# Patient Record
Sex: MALE | Race: BLACK OR AFRICAN AMERICAN | URBAN - METROPOLITAN AREA
[De-identification: names, ages, dates, MRNs, and addresses within clinical notes are randomized per-mention and may not be internally consistent; named-entity substitution may affect disease eponyms.]

---

## 2018-08-17 ENCOUNTER — HISTORICAL (OUTPATIENT)
Dept: ADMINISTRATIVE | Facility: HOSPITAL | Age: 43
End: 2018-08-17

## 2018-08-17 LAB
ABS NEUT (OLG): 1.4 X10(3)/MCL
ALBUMIN SERPL-MCNC: 3.6 GM/DL (ref 3.4–5)
ALBUMIN/GLOB SERPL: 1 RATIO (ref 1–2)
ALP SERPL-CCNC: 61 UNIT/L (ref 45–117)
ALT SERPL-CCNC: 26 UNIT/L (ref 12–78)
ANISOCYTOSIS BLD QL SMEAR: ABNORMAL
APPEARANCE, UA: CLEAR
AST SERPL-CCNC: 25 UNIT/L (ref 15–37)
BACTERIA #/AREA URNS AUTO: ABNORMAL /[HPF]
BASOPHILS NFR BLD MANUAL: 0 %
BILIRUB SERPL-MCNC: 0.3 MG/DL (ref 0.2–1)
BILIRUB UR QL STRIP: NEGATIVE
BILIRUBIN DIRECT+TOT PNL SERPL-MCNC: 0.1 MG/DL
BILIRUBIN DIRECT+TOT PNL SERPL-MCNC: 0.2 MG/DL
BUN SERPL-MCNC: 10 MG/DL (ref 7–18)
CALCIUM SERPL-MCNC: 8.7 MG/DL (ref 8.5–10.1)
CD3+CD4+ CELLS # SPEC: 137 UNIT/L (ref 589–1505)
CD3+CD4+ CELLS NFR BLD: 6.8 % (ref 31–59)
CHLORIDE SERPL-SCNC: 109 MMOL/L (ref 98–107)
CHOLEST SERPL-MCNC: 97 MG/DL
CHOLEST/HDLC SERPL: 3.6 {RATIO} (ref 0–5)
CO2 SERPL-SCNC: 25 MMOL/L (ref 21–32)
COLOR UR: YELLOW
CREAT SERPL-MCNC: 0.8 MG/DL (ref 0.6–1.3)
DEPRECATED CALCIDIOL+CALCIFEROL SERPL-MC: 18.45 NG/ML (ref 30–80)
EOSINOPHIL NFR BLD MANUAL: 8 %
ERYTHROCYTE [DISTWIDTH] IN BLOOD BY AUTOMATED COUNT: 11.9 % (ref 11.5–14.5)
GLOBULIN SER-MCNC: 4.9 GM/ML (ref 2.3–3.5)
GLUCOSE (UA): NORMAL
GLUCOSE SERPL-MCNC: 83 MG/DL (ref 74–106)
GRANULOCYTES NFR BLD MANUAL: 33 % (ref 43–75)
HAV AB SER QL IA: REACTIVE
HBV SURFACE AB SER-ACNC: <3.1 MIU/ML
HBV SURFACE AB SERPL IA-ACNC: NONREACTIVE M[IU]/ML
HBV SURFACE AG SERPL QL IA: NEGATIVE
HCT VFR BLD AUTO: 40.9 % (ref 40–51)
HCV AB SERPL QL IA: NONREACTIVE
HDLC SERPL-MCNC: 27 MG/DL
HGB BLD-MCNC: 13.9 GM/DL (ref 13.5–17.5)
HGB UR QL STRIP: 0.2 MG/DL
HYALINE CASTS #/AREA URNS LPF: ABNORMAL /[LPF]
KETONES UR QL STRIP: NEGATIVE
LDLC SERPL CALC-MCNC: 34 MG/DL (ref 0–130)
LEUKOCYTE ESTERASE UR QL STRIP: NEGATIVE
LYMPHOCYTES # BLD AUTO: 2021 UNIT/L (ref 1260–5520)
LYMPHOCYTES NFR BLD MANUAL: 47 % (ref 20.5–51.1)
LYMPHOCYTES NFR LN MANUAL: 47 % (ref 28–48)
LYMPHOMA - T-CELL MARKERS SPEC-IMP: ABNORMAL
MCH RBC QN AUTO: 31.2 PG (ref 26–34)
MCHC RBC AUTO-ENTMCNC: 34 GM/DL (ref 31–37)
MCV RBC AUTO: 91.7 FL (ref 80–100)
MONOCYTES NFR BLD MANUAL: 12 % (ref 2–9)
NEG CONT SPOT COUNT: NORMAL
NITRITE UR QL STRIP: NEGATIVE
PANEL A SPOT COUNT: 0
PANEL B SPOT COUNT: 0
PH UR STRIP: 6.5 [PH] (ref 4.5–8)
PLATELET # BLD AUTO: 99 X10(3)/MCL (ref 130–400)
PLATELET # BLD EST: ABNORMAL 10*3/UL
PMV BLD AUTO: 13.7 FL (ref 7.4–10.4)
POS CONT SPOT COUNT: NORMAL
POTASSIUM SERPL-SCNC: 3.6 MMOL/L (ref 3.5–5.1)
PROT SERPL-MCNC: 8.5 GM/DL (ref 6.4–8.2)
PROT UR QL STRIP: 70 MG/DL
RBC # BLD AUTO: 4.46 X10(6)/MCL (ref 4.5–5.9)
RBC #/AREA URNS AUTO: ABNORMAL /[HPF]
RBC MORPH BLD: ABNORMAL
SODIUM SERPL-SCNC: 141 MMOL/L (ref 136–145)
SP GR UR STRIP: 1.03 (ref 1–1.03)
SQUAMOUS #/AREA URNS LPF: ABNORMAL /[LPF]
T PALLIDUM AB SER QL: NONREACTIVE
T-SPOT.TB: NEGATIVE
TRIGL SERPL-MCNC: 179 MG/DL
TSH SERPL-ACNC: 0.65 MIU/L (ref 0.36–3.74)
UROBILINOGEN UR STRIP-ACNC: 2 MG/DL
VLDLC SERPL CALC-MCNC: 36 MG/DL
WBC # BLD AUTO: 4300 /MM3 (ref 4500–11500)
WBC # SPEC AUTO: 4.3 X10(3)/MCL (ref 4.5–11)
WBC #/AREA URNS AUTO: ABNORMAL /HPF

## 2020-01-02 ENCOUNTER — HISTORICAL (OUTPATIENT)
Dept: ADMINISTRATIVE | Facility: HOSPITAL | Age: 45
End: 2020-01-02

## 2020-01-02 LAB
ABS NEUT (OLG): 9.36 X10(3)/MCL (ref 2.1–9.2)
ALBUMIN SERPL-MCNC: 3.3 GM/DL (ref 3.4–5)
ALBUMIN/GLOB SERPL: 0.5 RATIO (ref 1.1–2)
ALP SERPL-CCNC: 60 UNIT/L (ref 45–117)
ALT SERPL-CCNC: 15 UNIT/L (ref 12–78)
APPEARANCE, UA: CLEAR
APTT PPP: 32.9 SECOND(S) (ref 23.3–37)
AST SERPL-CCNC: 12 UNIT/L (ref 15–37)
BACTERIA #/AREA URNS AUTO: ABNORMAL /HPF
BASOPHILS # BLD AUTO: 0 X10(3)/MCL (ref 0–0.2)
BASOPHILS NFR BLD AUTO: 0 %
BILIRUB SERPL-MCNC: 0.5 MG/DL (ref 0.2–1)
BILIRUB UR QL STRIP: NEGATIVE
BILIRUBIN DIRECT+TOT PNL SERPL-MCNC: 0.2 MG/DL (ref 0–0.2)
BILIRUBIN DIRECT+TOT PNL SERPL-MCNC: 0.3 MG/DL
BUN SERPL-MCNC: 13 MG/DL (ref 7–18)
CALCIUM SERPL-MCNC: 9.6 MG/DL (ref 8.5–10.1)
CD3+CD4+ CELLS # SPEC: 115 UNIT/L (ref 589–1505)
CD3+CD4+ CELLS NFR BLD: 7.3 % (ref 31–59)
CHLORIDE SERPL-SCNC: 100 MMOL/L (ref 98–107)
CO2 SERPL-SCNC: 25 MMOL/L (ref 21–32)
COLOR UR: YELLOW
CREAT SERPL-MCNC: 1.1 MG/DL (ref 0.6–1.3)
EOSINOPHIL # BLD AUTO: 0 X10(3)/MCL (ref 0–0.9)
EOSINOPHIL NFR BLD AUTO: 0 %
ERYTHROCYTE [DISTWIDTH] IN BLOOD BY AUTOMATED COUNT: 12 % (ref 11.5–14.5)
GLOBULIN SER-MCNC: 6.4 GM/ML (ref 2.3–3.5)
GLUCOSE (UA): NEGATIVE
GLUCOSE SERPL-MCNC: 95 MG/DL (ref 74–106)
HAV IGM SERPL QL IA: NONREACTIVE
HBV CORE IGM SERPL QL IA: NONREACTIVE
HBV SURFACE AG SERPL QL IA: NEGATIVE
HCT VFR BLD AUTO: 43.5 % (ref 40–51)
HCV AB SERPL QL IA: NONREACTIVE
HGB BLD-MCNC: 14.4 GM/DL (ref 13.5–17.5)
HGB UR QL STRIP: 1 MG/DL
HYALINE CASTS #/AREA URNS LPF: ABNORMAL /LPF
IMM GRANULOCYTES # BLD AUTO: 0.12 10*3/UL
IMM GRANULOCYTES NFR BLD AUTO: 1 %
INR PPP: 1.06 (ref 0.9–1.2)
KETONES UR QL STRIP: ABNORMAL
LACTATE SERPL-SCNC: 1 MMOL/L (ref 0.4–2)
LEUKOCYTE ESTERASE UR QL STRIP: NEGATIVE
LIPASE SERPL-CCNC: 75 UNIT/L (ref 73–393)
LYMPHOCYTES # BLD AUTO: 1573 UNIT/L (ref 1260–5520)
LYMPHOCYTES # BLD AUTO: 2.5 X10(3)/MCL (ref 0.6–4.6)
LYMPHOCYTES NFR BLD AUTO: 19 %
LYMPHOCYTES NFR LN MANUAL: 13 % (ref 28–48)
LYMPHOMA - T-CELL MARKERS SPEC-IMP: ABNORMAL
MAGNESIUM SERPL-MCNC: 2.4 MG/DL (ref 1.6–2.6)
MCH RBC QN AUTO: 30.3 PG (ref 26–34)
MCHC RBC AUTO-ENTMCNC: 33.1 GM/DL (ref 31–37)
MCV RBC AUTO: 91.4 FL (ref 80–100)
MONOCYTES # BLD AUTO: 1.3 X10(3)/MCL (ref 0.1–1.3)
MONOCYTES NFR BLD AUTO: 10 %
NEUTROPHILS # BLD AUTO: 9.36 X10(3)/MCL (ref 2.1–9.2)
NEUTROPHILS NFR BLD AUTO: 70 %
NITRITE UR QL STRIP: NEGATIVE
PH UR STRIP: 6 [PH] (ref 4.5–8)
PLATELET # BLD AUTO: 141 X10(3)/MCL (ref 130–400)
PMV BLD AUTO: 13.4 FL (ref 7.4–10.4)
POTASSIUM SERPL-SCNC: 3.6 MMOL/L (ref 3.5–5.1)
PROT SERPL-MCNC: 9.7 GM/DL (ref 6.4–8.2)
PROT UR QL STRIP: 300 MG/DL
PROTHROMBIN TIME: 13.7 SECOND(S) (ref 11.9–14.4)
RBC # BLD AUTO: 4.76 X10(6)/MCL (ref 4.5–5.9)
RBC #/AREA URNS AUTO: ABNORMAL /HPF
SODIUM SERPL-SCNC: 131 MMOL/L (ref 136–145)
SP GR UR STRIP: 1.03 (ref 1–1.03)
SQUAMOUS #/AREA URNS LPF: ABNORMAL /LPF
T PALLIDUM AB SER QL: NONREACTIVE
UROBILINOGEN UR STRIP-ACNC: 4 MG/DL
WBC # BLD AUTO: ABNORMAL /MM3 (ref 4500–11500)
WBC # SPEC AUTO: 13.4 X10(3)/MCL (ref 4.5–11)
WBC #/AREA URNS AUTO: ABNORMAL /HPF

## 2020-01-03 LAB
ABS NEUT (OLG): 9.1 X10(3)/MCL (ref 2.1–9.2)
BASOPHILS # BLD AUTO: 0 X10(3)/MCL (ref 0–0.2)
BASOPHILS NFR BLD AUTO: 0 %
BUN SERPL-MCNC: 13 MG/DL (ref 7–18)
CALCIUM SERPL-MCNC: 8.2 MG/DL (ref 8.5–10.1)
CHLORIDE SERPL-SCNC: 106 MMOL/L (ref 98–107)
CO2 SERPL-SCNC: 23 MMOL/L (ref 21–32)
CREAT SERPL-MCNC: 1 MG/DL (ref 0.6–1.3)
CREAT/UREA NIT SERPL: 13
EOSINOPHIL # BLD AUTO: 0 X10(3)/MCL (ref 0–0.9)
EOSINOPHIL NFR BLD AUTO: 0 %
ERYTHROCYTE [DISTWIDTH] IN BLOOD BY AUTOMATED COUNT: 11.9 % (ref 11.5–14.5)
GLUCOSE SERPL-MCNC: 98 MG/DL (ref 74–106)
HCT VFR BLD AUTO: 36.3 % (ref 40–51)
HGB BLD-MCNC: 12 GM/DL (ref 13.5–17.5)
IMM GRANULOCYTES # BLD AUTO: 0.09 10*3/UL
IMM GRANULOCYTES NFR BLD AUTO: 1 %
LYMPHOCYTES # BLD AUTO: 1.6 X10(3)/MCL (ref 0.6–4.6)
LYMPHOCYTES NFR BLD AUTO: 13 %
MCH RBC QN AUTO: 30.2 PG (ref 26–34)
MCHC RBC AUTO-ENTMCNC: 33.1 GM/DL (ref 31–37)
MCV RBC AUTO: 91.4 FL (ref 80–100)
MONOCYTES # BLD AUTO: 1.3 X10(3)/MCL (ref 0.1–1.3)
MONOCYTES NFR BLD AUTO: 11 %
NEUTROPHILS # BLD AUTO: 9.1 X10(3)/MCL (ref 2.1–9.2)
NEUTROPHILS NFR BLD AUTO: 75 %
PLATELET # BLD AUTO: 113 X10(3)/MCL (ref 130–400)
PMV BLD AUTO: 12.8 FL (ref 7.4–10.4)
POTASSIUM SERPL-SCNC: 3.5 MMOL/L (ref 3.5–5.1)
RBC # BLD AUTO: 3.97 X10(6)/MCL (ref 4.5–5.9)
SODIUM SERPL-SCNC: 136 MMOL/L (ref 136–145)
WBC # SPEC AUTO: 12.1 X10(3)/MCL (ref 4.5–11)

## 2020-01-04 LAB — GRAM STN SPEC: NORMAL

## 2020-01-07 LAB — FINAL CULTURE: NORMAL

## 2020-02-07 LAB — FINAL CULTURE: NORMAL

## 2020-05-04 LAB
ABS NEUT (OLG): 6.9 X10(3)/MCL (ref 2.1–9.2)
ALBUMIN SERPL-MCNC: 3.1 GM/DL (ref 3.4–5)
ALBUMIN/GLOB SERPL: 0.5 RATIO (ref 1.1–2)
ALP SERPL-CCNC: 48 UNIT/L (ref 45–117)
ALT SERPL-CCNC: 12 UNIT/L (ref 12–78)
APTT PPP: 32.8 SECOND(S) (ref 23.3–37)
AST SERPL-CCNC: 26 UNIT/L (ref 15–37)
BASOPHILS # BLD AUTO: 0 X10(3)/MCL (ref 0–0.2)
BASOPHILS NFR BLD AUTO: 0 %
BILIRUB SERPL-MCNC: 0.3 MG/DL (ref 0.2–1)
BILIRUBIN DIRECT+TOT PNL SERPL-MCNC: 0.1 MG/DL
BILIRUBIN DIRECT+TOT PNL SERPL-MCNC: 0.2 MG/DL (ref 0–0.2)
BUN SERPL-MCNC: 43 MG/DL (ref 7–18)
CALCIUM SERPL-MCNC: 10.3 MG/DL (ref 8.5–10.1)
CHLORIDE SERPL-SCNC: 103 MMOL/L (ref 98–107)
CO2 SERPL-SCNC: 18 MMOL/L (ref 21–32)
CREAT SERPL-MCNC: 2.3 MG/DL (ref 0.6–1.3)
EOSINOPHIL # BLD AUTO: 0.1 X10(3)/MCL (ref 0–0.9)
EOSINOPHIL NFR BLD AUTO: 1 %
ERYTHROCYTE [DISTWIDTH] IN BLOOD BY AUTOMATED COUNT: 12.4 % (ref 11.5–14.5)
GLOBULIN SER-MCNC: 6.5 GM/ML (ref 2.3–3.5)
GLUCOSE SERPL-MCNC: 90 MG/DL (ref 74–106)
HCT VFR BLD AUTO: 31.1 % (ref 40–51)
HGB BLD-MCNC: 10.1 GM/DL (ref 13.5–17.5)
IMM GRANULOCYTES # BLD AUTO: 0.1 10*3/UL
IMM GRANULOCYTES NFR BLD AUTO: 1 %
INR PPP: 1.11 (ref 0.9–1.2)
LACTATE SERPL-SCNC: 1.4 MMOL/L (ref 0.4–2)
LYMPHOCYTES # BLD AUTO: 1.8 X10(3)/MCL (ref 0.6–4.6)
LYMPHOCYTES NFR BLD AUTO: 18 %
MCH RBC QN AUTO: 30.1 PG (ref 26–34)
MCHC RBC AUTO-ENTMCNC: 32.5 GM/DL (ref 31–37)
MCV RBC AUTO: 92.6 FL (ref 80–100)
MONOCYTES # BLD AUTO: 1 X10(3)/MCL (ref 0.1–1.3)
MONOCYTES NFR BLD AUTO: 10 %
NEUTROPHILS # BLD AUTO: 6.9 X10(3)/MCL (ref 2.1–9.2)
NEUTROPHILS NFR BLD AUTO: 70 %
PLATELET # BLD AUTO: 310 X10(3)/MCL (ref 130–400)
PMV BLD AUTO: 11.1 FL (ref 7.4–10.4)
POTASSIUM SERPL-SCNC: 4.6 MMOL/L (ref 3.5–5.1)
PROT SERPL-MCNC: 9.6 GM/DL (ref 6.4–8.2)
PROTHROMBIN TIME: 14.2 SECOND(S) (ref 11.9–14.4)
RBC # BLD AUTO: 3.36 X10(6)/MCL (ref 4.5–5.9)
SODIUM SERPL-SCNC: 132 MMOL/L (ref 136–145)
WBC # SPEC AUTO: 9.9 X10(3)/MCL (ref 4.5–11)

## 2020-05-05 LAB
ABS NEUT (OLG): 6.44 X10(3)/MCL (ref 2.1–9.2)
ALBUMIN SERPL-MCNC: 2.4 GM/DL (ref 3.4–5)
ALBUMIN/GLOB SERPL: 0.4 RATIO (ref 1.1–2)
ALP SERPL-CCNC: 40 UNIT/L (ref 45–117)
ALT SERPL-CCNC: 9 UNIT/L (ref 12–78)
APPEARANCE, UA: CLEAR
AST SERPL-CCNC: 21 UNIT/L (ref 15–37)
BACTERIA #/AREA URNS AUTO: ABNORMAL /HPF
BASOPHILS # BLD AUTO: 0 X10(3)/MCL (ref 0–0.2)
BASOPHILS NFR BLD AUTO: 0 %
BILIRUB SERPL-MCNC: 0.3 MG/DL (ref 0.2–1)
BILIRUB UR QL STRIP: NEGATIVE
BILIRUBIN DIRECT+TOT PNL SERPL-MCNC: 0.1 MG/DL
BILIRUBIN DIRECT+TOT PNL SERPL-MCNC: 0.2 MG/DL (ref 0–0.2)
BUN SERPL-MCNC: 24 MG/DL (ref 7–18)
BUN SERPL-MCNC: 31 MG/DL (ref 7–18)
CALCIUM SERPL-MCNC: 9 MG/DL (ref 8.5–10.1)
CALCIUM SERPL-MCNC: 9.2 MG/DL (ref 8.5–10.1)
CD3+CD4+ CELLS # SPEC: 129 UNIT/L (ref 589–1505)
CD3+CD4+ CELLS NFR BLD: 7.2 % (ref 31–59)
CHLORIDE SERPL-SCNC: 109 MMOL/L (ref 98–107)
CHLORIDE SERPL-SCNC: 110 MMOL/L (ref 98–107)
CHLORIDE UR-SCNC: 83 MMOL/L
CO2 SERPL-SCNC: 14 MMOL/L (ref 21–32)
CO2 SERPL-SCNC: 19 MMOL/L (ref 21–32)
COLOR UR: ABNORMAL
CREAT SERPL-MCNC: 1.5 MG/DL (ref 0.6–1.3)
CREAT SERPL-MCNC: 1.7 MG/DL (ref 0.6–1.3)
CREAT UR-MCNC: 68 MG/DL
CREAT/UREA NIT SERPL: 16
EOSINOPHIL # BLD AUTO: 0.1 X10(3)/MCL (ref 0–0.9)
EOSINOPHIL NFR BLD AUTO: 1 %
ERYTHROCYTE [DISTWIDTH] IN BLOOD BY AUTOMATED COUNT: 12.2 % (ref 11.5–14.5)
GLOBULIN SER-MCNC: 5.5 GM/ML (ref 2.3–3.5)
GLUCOSE (UA): NEGATIVE
GLUCOSE SERPL-MCNC: 116 MG/DL (ref 74–106)
GLUCOSE SERPL-MCNC: 99 MG/DL (ref 74–106)
HCT VFR BLD AUTO: 26.2 % (ref 40–51)
HGB BLD-MCNC: 8.5 GM/DL (ref 13.5–17.5)
HGB UR QL STRIP: NEGATIVE
HYALINE CASTS #/AREA URNS LPF: ABNORMAL /LPF
IMM GRANULOCYTES # BLD AUTO: 0.1 10*3/UL
IMM GRANULOCYTES NFR BLD AUTO: 1 %
KETONES UR QL STRIP: NEGATIVE
LEUKOCYTE ESTERASE UR QL STRIP: NEGATIVE
LYMPHOCYTES # BLD AUTO: 1785 UNIT/L (ref 1260–5520)
LYMPHOCYTES # BLD AUTO: 2 X10(3)/MCL (ref 0.6–4.6)
LYMPHOCYTES NFR BLD AUTO: 21 %
LYMPHOCYTES NFR LN MANUAL: 21 % (ref 28–48)
LYMPHOMA - T-CELL MARKERS SPEC-IMP: ABNORMAL
MCH RBC QN AUTO: 29.7 PG (ref 26–34)
MCHC RBC AUTO-ENTMCNC: 32.4 GM/DL (ref 31–37)
MCV RBC AUTO: 91.6 FL (ref 80–100)
MONOCYTES # BLD AUTO: 0.9 X10(3)/MCL (ref 0.1–1.3)
MONOCYTES NFR BLD AUTO: 9 %
NEUTROPHILS # BLD AUTO: 6.44 X10(3)/MCL (ref 2.1–9.2)
NEUTROPHILS NFR BLD AUTO: 67 %
NITRITE UR QL STRIP: NEGATIVE
PH UR STRIP: 5.5 [PH] (ref 4.5–8)
PLATELET # BLD AUTO: 273 X10(3)/MCL (ref 130–400)
PMV BLD AUTO: 11.6 FL (ref 7.4–10.4)
POTASSIUM SERPL-SCNC: 4.2 MMOL/L (ref 3.5–5.1)
POTASSIUM SERPL-SCNC: 4.3 MMOL/L (ref 3.5–5.1)
POTASSIUM UR-SCNC: 19 MMOL/L
PROT SERPL-MCNC: 7.9 GM/DL (ref 6.4–8.2)
PROT UR QL STRIP: 30 MG/DL
PROT UR STRIP-MCNC: 75 MG/DL
RBC # BLD AUTO: 2.86 X10(6)/MCL (ref 4.5–5.9)
RBC #/AREA URNS AUTO: ABNORMAL /HPF
SARS-COV-2 RNA RESP QL NAA+PROBE: NOT DETECTED
SODIUM SERPL-SCNC: 135 MMOL/L (ref 136–145)
SODIUM SERPL-SCNC: 136 MMOL/L (ref 136–145)
SODIUM UR-SCNC: 75 MMOL/L
SP GR UR STRIP: 1.03 (ref 1–1.03)
SQUAMOUS #/AREA URNS LPF: ABNORMAL /LPF
UROBILINOGEN UR STRIP-ACNC: NORMAL
WBC # BLD AUTO: 8500 /MM3 (ref 4500–11500)
WBC # SPEC AUTO: 9.6 X10(3)/MCL (ref 4.5–11)
WBC #/AREA URNS AUTO: ABNORMAL /HPF

## 2020-05-06 ENCOUNTER — HISTORICAL (OUTPATIENT)
Dept: ADMINISTRATIVE | Facility: HOSPITAL | Age: 45
End: 2020-05-06

## 2020-05-06 LAB
ABS NEUT (OLG): 5.38 X10(3)/MCL (ref 2.1–9.2)
ABS NEUT (OLG): 8.04 X10(3)/MCL (ref 2.1–9.2)
ALBUMIN SERPL-MCNC: 2.2 GM/DL (ref 3.4–5)
ALBUMIN/GLOB SERPL: 0.4 RATIO (ref 1.1–2)
ALP SERPL-CCNC: 38 UNIT/L (ref 45–117)
ALT SERPL-CCNC: 8 UNIT/L (ref 12–78)
ANISOCYTOSIS BLD QL SMEAR: ABNORMAL
AST SERPL-CCNC: 23 UNIT/L (ref 15–37)
BASOPHILS # BLD AUTO: 0 X10(3)/MCL (ref 0–0.2)
BASOPHILS NFR BLD AUTO: 0 %
BASOPHILS NFR BLD MANUAL: 0 %
BILIRUB SERPL-MCNC: 0.3 MG/DL (ref 0.2–1)
BILIRUBIN DIRECT+TOT PNL SERPL-MCNC: 0.1 MG/DL (ref 0–0.2)
BILIRUBIN DIRECT+TOT PNL SERPL-MCNC: 0.2 MG/DL
BUN SERPL-MCNC: 17 MG/DL (ref 7–18)
CALCIUM SERPL-MCNC: 9.2 MG/DL (ref 8.5–10.1)
CD3+CD4+ CELLS # SPEC: 65 UNIT/L (ref 589–1505)
CD3+CD4+ CELLS NFR BLD: 6.3 % (ref 31–59)
CEA SERPL-MCNC: 0.6 NG/ML
CHLORIDE SERPL-SCNC: 111 MMOL/L (ref 98–107)
CO2 SERPL-SCNC: 18 MMOL/L (ref 21–32)
CREAT SERPL-MCNC: 1.2 MG/DL (ref 0.6–1.3)
EOSINOPHIL # BLD AUTO: 0.2 X10(3)/MCL (ref 0–0.9)
EOSINOPHIL NFR BLD AUTO: 2 %
EOSINOPHIL NFR BLD MANUAL: 0 %
ERYTHROCYTE [DISTWIDTH] IN BLOOD BY AUTOMATED COUNT: 12.5 % (ref 11.5–14.5)
ERYTHROCYTE [DISTWIDTH] IN BLOOD BY AUTOMATED COUNT: 12.5 % (ref 11.5–14.5)
GLOBULIN SER-MCNC: 5 GM/ML (ref 2.3–3.5)
GLUCOSE SERPL-MCNC: 91 MG/DL (ref 74–106)
GRANULOCYTES NFR BLD MANUAL: 96 % (ref 43–75)
HAV IGM SERPL QL IA: NONREACTIVE
HBV CORE IGM SERPL QL IA: NONREACTIVE
HBV SURFACE AG SERPL QL IA: NONREACTIVE
HCT VFR BLD AUTO: 23.5 % (ref 40–51)
HCT VFR BLD AUTO: 24.6 % (ref 40–51)
HCV AB SERPL QL IA: NONREACTIVE
HGB BLD-MCNC: 7.9 GM/DL (ref 13.5–17.5)
HGB BLD-MCNC: 8 GM/DL (ref 13.5–17.5)
HYPOCHROMIA BLD QL SMEAR: ABNORMAL
IMM GRANULOCYTES # BLD AUTO: 0.1 10*3/UL
IMM GRANULOCYTES NFR BLD AUTO: 1 %
LYMPHOCYTES # BLD AUTO: 1.8 X10(3)/MCL (ref 0.6–4.6)
LYMPHOCYTES # BLD AUTO: 1032 UNIT/L (ref 1260–5520)
LYMPHOCYTES NFR BLD AUTO: 21 %
LYMPHOCYTES NFR BLD MANUAL: 4 % (ref 20.5–51.1)
LYMPHOCYTES NFR LN MANUAL: 12 % (ref 28–48)
LYMPHOMA - T-CELL MARKERS SPEC-IMP: ABNORMAL
MCH RBC QN AUTO: 29.7 PG (ref 26–34)
MCH RBC QN AUTO: 30.4 PG (ref 26–34)
MCHC RBC AUTO-ENTMCNC: 32.5 GM/DL (ref 31–37)
MCHC RBC AUTO-ENTMCNC: 33.6 GM/DL (ref 31–37)
MCV RBC AUTO: 90.4 FL (ref 80–100)
MCV RBC AUTO: 91.4 FL (ref 80–100)
MICROCYTES BLD QL SMEAR: ABNORMAL
MONOCYTES # BLD AUTO: 1.1 X10(3)/MCL (ref 0.1–1.3)
MONOCYTES NFR BLD AUTO: 12 %
MONOCYTES NFR BLD MANUAL: 0 % (ref 2–9)
NEUTROPHILS # BLD AUTO: 5.38 X10(3)/MCL (ref 2.1–9.2)
NEUTROPHILS NFR BLD AUTO: 63 %
PLATELET # BLD AUTO: 245 X10(3)/MCL (ref 130–400)
PLATELET # BLD AUTO: 276 X10(3)/MCL (ref 130–400)
PLATELET # BLD EST: NORMAL 10*3/UL
PMV BLD AUTO: 11.1 FL (ref 7.4–10.4)
PMV BLD AUTO: 11.5 FL (ref 7.4–10.4)
POTASSIUM SERPL-SCNC: 4.1 MMOL/L (ref 3.5–5.1)
PROT SERPL-MCNC: 7.2 GM/DL (ref 6.4–8.2)
RBC # BLD AUTO: 2.6 X10(6)/MCL (ref 4.5–5.9)
RBC # BLD AUTO: 2.69 X10(6)/MCL (ref 4.5–5.9)
RBC MORPH BLD: ABNORMAL
SODIUM SERPL-SCNC: 138 MMOL/L (ref 136–145)
T PALLIDUM AB SER QL: NONREACTIVE
VANCOMYCIN SERPL-MCNC: <0.8 MCG/ML
WBC # BLD AUTO: 8600 /MM3 (ref 4500–11500)
WBC # SPEC AUTO: 8.5 X10(3)/MCL (ref 4.5–11)
WBC # SPEC AUTO: 8.9 X10(3)/MCL (ref 4.5–11)

## 2020-05-07 LAB
ABS NEUT (OLG): 7.11 X10(3)/MCL (ref 2.1–9.2)
ALBUMIN SERPL-MCNC: 2 GM/DL (ref 3.4–5)
ALBUMIN/GLOB SERPL: 0.4 RATIO (ref 1.1–2)
ALP SERPL-CCNC: 44 UNIT/L (ref 45–117)
ALT SERPL-CCNC: 7 UNIT/L (ref 12–78)
AST SERPL-CCNC: 21 UNIT/L (ref 15–37)
BASOPHILS # BLD AUTO: 0 X10(3)/MCL (ref 0–0.2)
BASOPHILS NFR BLD AUTO: 0 %
BILIRUB SERPL-MCNC: 0.2 MG/DL (ref 0.2–1)
BILIRUBIN DIRECT+TOT PNL SERPL-MCNC: <0.1 MG/DL (ref 0–0.2)
BILIRUBIN DIRECT+TOT PNL SERPL-MCNC: ABNORMAL MG/DL
BUN SERPL-MCNC: 20 MG/DL (ref 7–18)
CALCIUM SERPL-MCNC: 9 MG/DL (ref 8.5–10.1)
CHLORIDE SERPL-SCNC: 112 MMOL/L (ref 98–107)
CO2 SERPL-SCNC: 19 MMOL/L (ref 21–32)
CREAT SERPL-MCNC: 1.1 MG/DL (ref 0.6–1.3)
ERYTHROCYTE [DISTWIDTH] IN BLOOD BY AUTOMATED COUNT: 12.5 % (ref 11.5–14.5)
GLOBULIN SER-MCNC: 4.9 GM/ML (ref 2.3–3.5)
GLUCOSE SERPL-MCNC: 124 MG/DL (ref 74–106)
HCT VFR BLD AUTO: 22 % (ref 40–51)
HGB BLD-MCNC: 7.3 GM/DL (ref 13.5–17.5)
IMM GRANULOCYTES # BLD AUTO: 0.1 10*3/UL
IMM GRANULOCYTES NFR BLD AUTO: 1 %
LYMPHOCYTES # BLD AUTO: 1 X10(3)/MCL (ref 0.6–4.6)
LYMPHOCYTES NFR BLD AUTO: 12 %
MCH RBC QN AUTO: 30.3 PG (ref 26–34)
MCHC RBC AUTO-ENTMCNC: 33.2 GM/DL (ref 31–37)
MCV RBC AUTO: 91.3 FL (ref 80–100)
MONOCYTES # BLD AUTO: 0.4 X10(3)/MCL (ref 0.1–1.3)
MONOCYTES NFR BLD AUTO: 5 %
NEG CONT SPOT COUNT: NORMAL
NEUTROPHILS # BLD AUTO: 7.11 X10(3)/MCL (ref 2.1–9.2)
NEUTROPHILS NFR BLD AUTO: 82 %
PANEL A SPOT COUNT: 0
PANEL B SPOT COUNT: 0
PLATELET # BLD AUTO: 243 X10(3)/MCL (ref 130–400)
PMV BLD AUTO: 11.4 FL (ref 7.4–10.4)
POS CONT SPOT COUNT: NORMAL
POTASSIUM SERPL-SCNC: 4.7 MMOL/L (ref 3.5–5.1)
PROT SERPL-MCNC: 6.9 GM/DL (ref 6.4–8.2)
RBC # BLD AUTO: 2.41 X10(6)/MCL (ref 4.5–5.9)
SODIUM SERPL-SCNC: 138 MMOL/L (ref 136–145)
T-SPOT.TB: NORMAL
VANCOMYCIN SERPL-MCNC: 13.6 MCG/ML
WBC # SPEC AUTO: 8.6 X10(3)/MCL (ref 4.5–11)

## 2020-05-08 LAB
ABS NEUT (OLG): 6.41 X10(3)/MCL (ref 2.1–9.2)
ALBUMIN SERPL-MCNC: 2 GM/DL (ref 3.4–5)
ALBUMIN/GLOB SERPL: 0.4 RATIO (ref 1.1–2)
ALP SERPL-CCNC: 32 UNIT/L (ref 45–117)
ALT SERPL-CCNC: 9 UNIT/L (ref 12–78)
AST SERPL-CCNC: 29 UNIT/L (ref 15–37)
BASOPHILS # BLD AUTO: 0 X10(3)/MCL (ref 0–0.2)
BASOPHILS NFR BLD AUTO: 0 %
BILIRUB SERPL-MCNC: 0.3 MG/DL (ref 0.2–1)
BILIRUBIN DIRECT+TOT PNL SERPL-MCNC: <0.1 MG/DL (ref 0–0.2)
BILIRUBIN DIRECT+TOT PNL SERPL-MCNC: ABNORMAL MG/DL
BUN SERPL-MCNC: 17 MG/DL (ref 7–18)
CALCIUM SERPL-MCNC: 8.6 MG/DL (ref 8.5–10.1)
CHLORIDE SERPL-SCNC: 110 MMOL/L (ref 98–107)
CO2 SERPL-SCNC: 23 MMOL/L (ref 21–32)
CREAT SERPL-MCNC: 0.9 MG/DL (ref 0.6–1.3)
ERYTHROCYTE [DISTWIDTH] IN BLOOD BY AUTOMATED COUNT: 12.5 % (ref 11.5–14.5)
GLOBULIN SER-MCNC: 4.7 GM/ML (ref 2.3–3.5)
GLUCOSE SERPL-MCNC: 89 MG/DL (ref 74–106)
HCT VFR BLD AUTO: 24.1 % (ref 40–51)
HGB BLD-MCNC: 7.7 GM/DL (ref 13.5–17.5)
IMM GRANULOCYTES # BLD AUTO: 0.11 10*3/UL
IMM GRANULOCYTES NFR BLD AUTO: 1 %
LYMPHOCYTES # BLD AUTO: 1.7 X10(3)/MCL (ref 0.6–4.6)
LYMPHOCYTES NFR BLD AUTO: 18 %
MCH RBC QN AUTO: 29.3 PG (ref 26–34)
MCHC RBC AUTO-ENTMCNC: 32 GM/DL (ref 31–37)
MCV RBC AUTO: 91.6 FL (ref 80–100)
MONOCYTES # BLD AUTO: 0.9 X10(3)/MCL (ref 0.1–1.3)
MONOCYTES NFR BLD AUTO: 10 %
NEUTROPHILS # BLD AUTO: 6.41 X10(3)/MCL (ref 2.1–9.2)
NEUTROPHILS NFR BLD AUTO: 70 %
PLATELET # BLD AUTO: 295 X10(3)/MCL (ref 130–400)
PMV BLD AUTO: 11.2 FL (ref 7.4–10.4)
POTASSIUM SERPL-SCNC: 3.8 MMOL/L (ref 3.5–5.1)
PROT SERPL-MCNC: 6.7 GM/DL (ref 6.4–8.2)
RBC # BLD AUTO: 2.63 X10(6)/MCL (ref 4.5–5.9)
SODIUM SERPL-SCNC: 140 MMOL/L (ref 136–145)
VANCOMYCIN TROUGH SERPL-MCNC: 12.2 MCG/ML (ref 10–20)
WBC # SPEC AUTO: 9.2 X10(3)/MCL (ref 4.5–11)

## 2020-05-10 LAB
FINAL CULTURE: NORMAL
FINAL CULTURE: NORMAL

## 2020-05-21 ENCOUNTER — HISTORICAL (OUTPATIENT)
Dept: ADMINISTRATIVE | Facility: HOSPITAL | Age: 45
End: 2020-05-21

## 2020-05-21 LAB
ABS NEUT (OLG): 8.48 X10(3)/MCL (ref 2.1–9.2)
ALBUMIN SERPL-MCNC: 2.9 GM/DL (ref 3.4–5)
ALBUMIN/GLOB SERPL: 0.5 RATIO (ref 1.1–2)
ALP SERPL-CCNC: 50 UNIT/L (ref 45–117)
ALT SERPL-CCNC: 14 UNIT/L (ref 12–78)
AST SERPL-CCNC: 36 UNIT/L (ref 15–37)
BASOPHILS # BLD AUTO: 0.1 X10(3)/MCL (ref 0–0.2)
BASOPHILS NFR BLD AUTO: 1 %
BILIRUB SERPL-MCNC: 0.2 MG/DL (ref 0.2–1)
BILIRUBIN DIRECT+TOT PNL SERPL-MCNC: 0.1 MG/DL
BILIRUBIN DIRECT+TOT PNL SERPL-MCNC: 0.1 MG/DL (ref 0–0.2)
BUN SERPL-MCNC: 12 MG/DL (ref 7–18)
CALCIUM SERPL-MCNC: 9.5 MG/DL (ref 8.5–10.1)
CD3+CD4+ CELLS # SPEC: 262 UNIT/L (ref 589–1505)
CD3+CD4+ CELLS NFR BLD: 11.4 % (ref 31–59)
CHLORIDE SERPL-SCNC: 101 MMOL/L (ref 98–107)
CO2 SERPL-SCNC: 23 MMOL/L (ref 21–32)
CREAT SERPL-MCNC: 1.2 MG/DL (ref 0.6–1.3)
EOSINOPHIL # BLD AUTO: 0.2 X10(3)/MCL (ref 0–0.9)
EOSINOPHIL NFR BLD AUTO: 2 %
ERYTHROCYTE [DISTWIDTH] IN BLOOD BY AUTOMATED COUNT: 14.6 % (ref 11.5–14.5)
GLOBULIN SER-MCNC: 6.3 GM/ML (ref 2.3–3.5)
GLUCOSE SERPL-MCNC: 112 MG/DL (ref 74–106)
HCT VFR BLD AUTO: 29.4 % (ref 40–51)
HGB BLD-MCNC: 8.9 GM/DL (ref 13.5–17.5)
IMM GRANULOCYTES # BLD AUTO: 0.14 10*3/UL
IMM GRANULOCYTES NFR BLD AUTO: 1 %
LYMPHOCYTES # BLD AUTO: 2.3 X10(3)/MCL (ref 0.6–4.6)
LYMPHOCYTES # BLD AUTO: 2299 UNIT/L (ref 1260–5520)
LYMPHOCYTES NFR BLD AUTO: 19 %
LYMPHOCYTES NFR LN MANUAL: 19 % (ref 28–48)
LYMPHOMA - T-CELL MARKERS SPEC-IMP: ABNORMAL
MCH RBC QN AUTO: 29.4 PG (ref 26–34)
MCHC RBC AUTO-ENTMCNC: 30.3 GM/DL (ref 31–37)
MCV RBC AUTO: 97 FL (ref 80–100)
MONOCYTES # BLD AUTO: 0.9 X10(3)/MCL (ref 0.1–1.3)
MONOCYTES NFR BLD AUTO: 8 %
NEUTROPHILS # BLD AUTO: 8.48 X10(3)/MCL (ref 2.1–9.2)
NEUTROPHILS NFR BLD AUTO: 70 %
PLATELET # BLD AUTO: 470 X10(3)/MCL (ref 130–400)
PMV BLD AUTO: 10.9 FL (ref 7.4–10.4)
POTASSIUM SERPL-SCNC: 3.9 MMOL/L (ref 3.5–5.1)
PROT SERPL-MCNC: 9.2 GM/DL (ref 6.4–8.2)
RBC # BLD AUTO: 3.03 X10(6)/MCL (ref 4.5–5.9)
SODIUM SERPL-SCNC: 134 MMOL/L (ref 136–145)
WBC # BLD AUTO: ABNORMAL /MM3 (ref 4500–11500)
WBC # SPEC AUTO: 12.1 X10(3)/MCL (ref 4.5–11)

## 2020-05-28 ENCOUNTER — HISTORICAL (OUTPATIENT)
Dept: ADMINISTRATIVE | Facility: HOSPITAL | Age: 45
End: 2020-05-28

## 2022-09-13 NOTE — HISTORICAL [facility] C[facility]ER
This is a historical note converted from Nancy. Formatting and pictures may have been removed.  Please reference Nancy for original formatting and attached multimedia. Chief Complaint  States abdominal and rectal pain x 10 days. ?Was seen at [facility] and wife ejected from hospital for asking too many questions. ?States her efor p[ain and rectal pain with ulceration.  History of Present Illness  Mr. Hill is a 44yoM PMH HIV/AIDS who presents with 12days of pain in his rectum. He describes pain as a pressure like pain that has worsened over the past week until presentation. No change in pain with bowel movements. Last BM 1/1, soft, no blood, reports white fluid?from anus. No reported fecal incontinence.?Reports night sweats, chills. No fever, no nausea, no vomiting. No abdominal pain.  ?  In the ED, patient was afebrile, hemodynamically stable. CBC was significant for WBC 13.4. CMP significant for Na 131. CT abdomen pelvis showed perirectal abscess.  ?  Of note, patient reports he has had a hard time taking HIV medication due to financial strain. Reports tenuous housing situation and poor follow up with HIV clinic (last reported labs 8 months ago).  Review of Systems  Constitutional:?no fever, fatigue, weakness  Neurologic: no headache, no dizziness, no weakness or numbness  Eye:?no vision loss, change in vision  Respiratory:?no cough, no wheezing, no shortness of breath  Cardiovascular:?no chest pain, no palpitations,  Gastrointestinal:?no nausea, vomiting, or diarrhea. No abdominal pain  Genitourinary:?no dysuria, no urinary frequency or urgency  Musculoskeletal:?no muscle or joint pain, no joint swelling  Integumentary:?multiple maculopapular skin lesions from reported psoriasis or dog bites  Physical Exam  Vitals & Measurements  T:?37.2? ?C (Oral)? TMIN:?37.2? ?C (Oral)? TMAX:?37.4? ?C (Oral)? HR:?87(Peripheral)? RR:?16? BP:?110/59? SpO2:?100%? WT:?75.95?kg?  General:?NAD, resting comfortably in bed  Eye: PERRL,  EOMI, clear conjunctiva,  HENT:?oropharynx without erythema/exudate, oropharynx and nasal mucosal surfaces moist,  Respiratory:?no increased WOB, equal chest rise bilaterally, no O2 requirements  Cardiovascular:?RR,?2+ radial, DP pulses?bilaterally  Gastrointestinal:?S/NT/ND  Integumentary: multiple maculopapular skin discolorations on bilateral UE/LE, torso  Rectal: White fluid present on first examination. Multiple skin tags present. ROMEO with good tone, no blood, no stool, + white fluid. Pain with perianal palpation in anterior aspect  Neurologic: CN 2-12 grossly intact, motor/sensory function grossly intact  ?  Labs  - CBC significant for WBC 13.4  ?   ?01/02/2020? CT Abdomen and Pelvis W Contrast  - Small left renal hypodensity most likely a cyst.  - Distal perirectal fluid collection measuring 53 x 46 mm?extending into left gluteal fat?  Assessment/Plan  Mr. Hill is a 44yoM [facility] HIV/AIDS who presents with qiana-rectal abscess on CT scan  ?  - Admit to inpatient  - Continue IV antibiotics  - NPO/IVF at midnight  - Booked and consented for EUA, Qiana-rectal abscess drainage tomorrow  - Multimodal pain control  - Will order CD4 count as patient reports difficulty taking medication 2/2 financial situation  - Medicine evaluation, outpatient ID consult?for [facility] HIV/AIDS  ?  Ho Blount, PGY-1   Problem List/Past Medical History  Ongoing  Anxiety state(  Confirmed  )  Depressive disorder(  Confirmed  )  HIV  Mononeuritis(  Confirmed  )  Toxoplasmosis(  Confirmed  )  Historical  No qualifying data  Medications  Inpatient  Flagyl 500 mg / 100 ml premix, 500 mg= 100 mL, IV Piggyback, q8hr  IVF D5 ? Normal Saline Infusion 1,000 mL, 1000 mL, IV  morphine, 1 mg= 0.5 mL, IV Push, q2hr, PRN  oxyCODONE, 5 mg= 1 tab(s), Oral, q4hr, PRN  oxyCODONE, 10 mg= 2 tab(s), Oral, q4hr, PRN  Protonix, 40 mg= 1 tab(s), Oral, Daily  Toradol 30 mg for IV Push, 15 mg= 0.5 mL, IV Push, q6hr, PRN  Tylenol, 1000 mg= 2 tab(s), Oral,  q8hr  Ultram, 50 mg= 1 tab(s), Oral, q4hr, PRN  Home  acetaminophen-hydrocodone 325 mg-7.5 mg oral tablet, 1 tab(s), Oral, TID  alPRAzolam 1 mg oral tablet  azithromycin 600 mg oral tablet, 1200 mg= 2 tab(s), Oral, qWeek, 1 refills  azithromycin 600 mg oral tablet, 1200 mg= 2 tab(s), Oral, qWeek, 1 refills  benzocaine 5% topical cream, 1 darryl, TOP, QID, PRN  Descovy 200 mg-25 mg oral tablet, 1 tab(s), Oral, Daily, 3 refills,? ?Not taking  hydrOXYzine hydrochloride 25 mg oral tablet, 25 mg= 1 tab(s), Oral, QID, PRN  leucovorin 10 mg oral tablet, 10 mg= 1 tab(s), Oral, Daily, 1 refills,? ?Not taking  Megace 40 mg/mL oral suspension, 800 mg= 20 mL, Oral, Daily, 3 refills  Prezcobix 800 mg-150 mg oral tablet, 1 tab(s), Oral, Daily, 3 refills,? ?Not taking  pyrimethamine 25 mg oral tablet, 75 mg= 3 tab(s), Oral, Daily, 1 refills  sulfADIAZINE 500 mg oral tablet, 750 mg= 1.5 tab(s), Oral, q6hr, 1 refills  Vitamin D 50,000 intl units oral capsule, 06248 IntUnit= 1 cap(s), Oral, 2x/Wk, 1 refills  zolpidem 10 mg oral tablet, 10 mg= 1 tab(s), Oral, Once a day (at bedtime), 1 refills  Allergies  No Known Medication Allergies  Social History  Abuse/Neglect  No, 01/02/2020  Alcohol  Past, 06/10/2015  Substance Use - Denies Substance Abuse, 05/06/2015  Past, Marijuana, 06/10/2015  Tobacco - Denies Tobacco Use, 04/09/2015  Former smoker, quit more than 30 days ago, N/A, 01/02/2020  Family History  Cardiac arrest.: Brother.      Above Hx and assessment reviewed and discussed with Resident.  Agree with plan of care.

## 2022-09-13 NOTE — HISTORICAL [facility] CERNER
This is a historical note converted from Nancy. Formatting and pictures may have been removed.  Please reference Nancy for original formatting and attached multimedia. Admit and Discharge Dates  Admit Date: 05/06/2020  Discharge Date: 05/08/2020  Physicians  Attending Physician - Ernst JACKSON, Nicki  Admitting Physician - Ernst JACKSON, Nicki  Consulting Physician - Machelle JACKSON, Bernard SHANNON  Consulting Physician - Farhat JACKSON, Neal Ham  Primary Care Physician - No PCP, No  Discharge Diagnosis  1.?HIV disease?B20,?AIDS?B20  ?  2.?Rectal mass?K62.89  ?  3.?Lesion of liver?K76.9  ?  4.?Adrenal nodule?E27.8  ?  Surgical Procedures  05/06/2020 - [id number] - Biopsy (SURG)  05/06/2020 - [id number] - Exam Under Anesthesia Rectal  Immunizations  01/03/2011 - pneumococcal 7-valent vaccine?  Admission Information  Mr. Sergio Mckeon?is a 44-year-old gentleman with?PMH of?HIV,?CNS Toxoplasmosis,?prior episode of?Perirectal Abscess?(s/p I&D and ABx therapy with Bactrim, Flagyl), Anxiety, Depression; he presented to the [facility] ER on 05/05?with complaints of?perirectal pain?for the last 3 to 4 weeks.?He was having?difficulty hearing and was?able to respond to questions after repetition and lip reading.?Mr. Mckeon described his symptoms as?progressively worsening pain and tenderness?in his perirectal region?with associated?drainage of pus and blood. ?He?stated that his pain was?constant?and pronounced with?passing stool,?which had pus and blood?in it.??He reported?that after undergoing?surgical?drainage of?a perirectal abscess that he was found to have in 01/2020,?he felt?a nodular swelling?at the site?which then progressed?to enlargement and?rupture?a few weeks ago?with drainage of?purulent?and bloody fluid.?He denied having any?fever, abdominal pain, shortness of breath, cough, sputum production,?nausea/vomiting, diarrhea/constipation,?headache, numbness, weakness, dizziness/lightheadedness or loss of consciousness. ?  Jack?prefers to be compliant with?his home prescription medications,?and stated that he had been taking his Biktarvy. ?He had?not followed up in?ID clinic?at the end of January as scheduled.  Significant Findings  Upon arrival to ED patient was found to have a red, 5 x 7 cm?tender growth in his immediate left perirectal region/anal verge?which was leaking foul-smelling fluid. Patient also had KENDRICK with BUN/Cr 43/2.3, patient was given IVF and KENDRICK resolved on 5/6/20.?CT abdomen and pelvis showed large indeterminate enhancing distal perirectal/anal masslike lesion; liver lesions and right adrenal nodules are indeterminate and new compared to January 2020. MRI pelvis showed Large irregular distal rectal/anal mass as discussed; Areas of right gluteal musculature enhancement medially, metastatic implants versus foci of infection. Surgery on call was consulted and brought the patient to OR on 5/5/20 and found a large fungating anal mass along the left and posterior anal margin; a sample was sent to pathology. Infectious disease was consulted to manage HIV. Patient was started back on Biktarvy daily, and sent with Flagyl 500mg TID and Cipro 500mg BID. On day of discharge discussed with patient and wife likely prognosis of malignancy and discussed palliative care and hospice. Patient previously had hospice and wanted to go home and talk with his wife more before making a decision. Patient stated ready to go home and had no complaints on day of discharge.  Time Spent on discharge  >30min  Objective  Vitals & Measurements  T:?36.6? ?C (Oral)? HR:?111(Peripheral)? HR:?102(Monitored)? RR:?20? BP:?134/86? SpO2:?100%?  Physical Exam  General: laying flat in bed, appears comfortable, frail appearing, hard of hearing  Eye: ?no scleral icterus  HENT: MMM  Neck: supple  Respiratory: clear to auscultation bilaterally, nonlabored respirations  Cardiovascular: regular rate and rhythm without murmurs, gallops or rubs; no pretibial  edema  Gastrointestinal:?soft, non-tender, non-distended, normoactive bowel sounds; Rectal exam: dressing in place?  Patient Discharge Condition  KENDRICK-resolved  HIV- Biktarvy, ID followup 5/21/20  Anal mass- pathology pending  Discharge Disposition  Discharge home. Patient discussing with wife hospice care options. Discussed in depth the need to be compliant with medications, patient and wife stated understanding. Return precautions given.   Discharge Medication Reconciliation  Prescribed  ciprofloxacin (Cipro 500 mg oral tablet)?500 mg, Oral, BID  metroNIDAZOLE (Flagyl 500 mg oral tablet)?500 mg, Oral, TID  traMADol (traMADol 50 mg oral tablet)?50 mg, Oral, BID, PRN pain, moderate  Continue  acetaminophen-HYDROcodone (acetaminophen-hydrocodone 325 mg-7.5 mg oral tablet)?1 tab(s), Oral, q6hr, PRN pain, severe  bictegravir/emtricitabine/tenofovir (Biktarvy oral tablet)?1 tab(s), Oral, Daily  gabapentin (gabapentin 300 mg oral capsule)?300 mg, Oral, TID  zolpidem (zolpidem 10 mg oral tablet)?10 mg, Oral, Once a day (at bedtime)  Discontinue  alPRAzolam (alPRAzolam 1 mg oral tablet)  azithromycin (azithromycin 600 mg oral tablet)?1,200 mg, Oral, qWeek  azithromycin (azithromycin 600 mg oral tablet)?1,200 mg, Oral, qWeek  benzocaine topical (benzocaine 5% topical cream)?1 darryl, TOP, QID, PRN as needed for mouth sore pain  bictegravir/emtricitabine/tenofovir (Biktarvy oral tablet)?1 tab(s), Oral, Daily  ciprofloxacin (ciprofloxacin 500 mg oral tablet)?500 mg, Oral, BID  cobicistat-darunavir (Prezcobix 800 mg-150 mg oral tablet)?1 tab(s), Oral, Daily  fluconazole (fluconazole 200 mg oral tablet)?200 mg, Oral, Daily  hydrOXYzine (hydrOXYzine hydrochloride 25 mg oral tablet)?25 mg, Oral, QID, PRN as needed for itching  leucovorin (leucovorin 10 mg oral tablet)?10 mg, Oral, Daily  megestrol (Megace 40 mg/mL oral suspension)?800 mg, Oral, Daily  methocarbamol (methocarbamol 750 mg oral tablet)?1500 mg, Oral, TID  metroNIDAZOLE  (metronidazole 500 mg oral tablet)?500 mg, Oral, q8hr  naloxone (Narcan 4 mg/0.1 mL nasal spray)?4 mg, Nasal, Once  oxyCODONE (oxycodone 5 mg oral tablet)?5 mg, Oral, q6hr  pyrimethamine (pyrimethamine 25 mg oral tablet)?75 mg, Oral, Daily  sulfADIAZINE (sulfADIAZINE 500 mg oral tablet)?750 mg, Oral, q6hr  sulfamethoxazole-trimethoprim (sulfamethoxazole-trimethoprim 800 mg-160 mg oral tablet)?2 tab(s), Oral, BID  Education and Orders Provided  How to Change Your Dressing, Easy-to-Read  How to Care for Yourself When You Have HIV  Discharge - 05/08/20 14:43:00 CDT, Home, meds to bed prior to discharge?  Follow up  [facility], on 05/21/2020  Report to Emergency Department if symptoms return or worsen      I was present with the Resident during discharge day management.  ?  [ x?] I discussed the case with the Resident and agree with the discharge plan as above.  [ ?] I discussed the case with the Resident and agree with the discharge plan as above?except:  ?  Time spent on discharge [ >30?] minutes  ?

## 2022-09-13 NOTE — HISTORICAL [facility] CERNER
This is a historical note converted from [employer]. Formatting and pictures may have been removed.  Please reference [employer]ner for original formatting and attached multimedia. Indication for Surgery  Perirectal abscess  Preoperative Diagnosis  Perirectal abscess  Postoperative Diagnosis  Perirectal abscess  Operation  Exam under anaesthesia  Incision and drainage of perirectal abscess  Surgeon(s)  Resident: Ho Blount MD  Staff: Marshall Newman MD  Assistant  Shane Lozano MD  Anesthesia  Syed Jeter MD  Estimated Blood Loss  50 ml  Findings  Purulent drainage from posterior aspect of rectum. Abscess cavity in posterior tracking laterally to left and right of midline.  Specimen(s)  Wound culture x2  Complications  None  Technique  The patient was taken to the operating room after obtaining an informed consent and positioned in the lithotomy position. Purulent drainage?evident from anus.?Perianal and?gluteal area prepped and draped in the usual fashion. 2 cultures of purulent fluid obtained.  ?  Digital rectal examination that showed no?blood, purulent drainage. Hill Nieto retractor used to examine rectal mucosa, which appeared?pink.?0.5 cm opening in the posterior?wall of rectum noted with purulent drainage. Wound extended with electrocautery and?abscess cavity was explored digitally.? All necrotic tissue was debrided. The cavity was fully explored and found to have no communication to any deeper structures. The cavity was irrigated with saline and Malecot drain was left in abscess cavity.  ?  Estimated blood loss was minimal. The patient tolerated the procedure well and was sent for recovery in satisfactory condition.  ?  Ho Blount, PGY-1   This certifies I was present during the entire period between opening and closing of the surgical field.

## 2022-09-13 NOTE — HISTORICAL [facility] CERNER
This is a historical note converted from Nancy. Formatting and pictures may have been removed.  Please reference Nancy for original formatting and attached multimedia. Chief Complaint  abscess ? between ?the ?buttocks. ?patient ?hard ?of ?hearing  Reason for Consultation  HIV  History of Present Illness  Mr Hill is a 44 yr old BM with HIV who was admitted to hospital with c/o rectal / anal pain?with drainage of blood and pus?in stool?for 3-4 weeks.? Pain continued to progressively worsen over this time.? Pt has had prior episodes of perirectal abscesses in the past.? Last perirectal abscess was surgically drained around January 2020.? Denies fever, chills, night sweats, HA, vision changes, dizziness, N/V/D, CP/SOB or abdominal pain. ?Pt currently takes Biktarvy for his HIV to which he reports 100% compliance.? He was supposed to follow up with Dr. INDIO Dougherty on 1/30/20 after last hospitalization, but missed appointment d/t transportation issues.? ID has been consulted for HIV management.? Surgery has evaluated pt and there is concern for perirectal mass / abscess vs malignancy.? Pt lives in [place] with wife.? Medical history is significant for HIV (reports diagnosis made about 14 years ago), history of toxoplasmosis (supposedly completed treatment per wife on last hospitalization), anxiety / depression, and perirectal abscesses.??Pt cannot confirm if he has been taking Bactrim DS, along with Flagyl.? 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 He sustained trauma to RT ear around age of 16.? States has seen specialists in the past for this.? Pt poor historian.  Review of Systems  Constitutional: no fever, fatigue, weakness  Eye: no vision loss, eye redness, drainage, or pain, no yellowing of the eyes  ENMT: no sore throat, ear pain, sinus pain/congestion, nasal congestion/drainage  Respiratory: no cough, no wheezing, no shortness of breath  Cardiovascular: no chest pain, no palpitations, no  edema  Gastrointestinal: no nausea, vomiting, or diarrhea. No abdominal pain, no david colored stools, + rectal / anal pain  Genitourinary: no dysuria, no urinary frequency or urgency, no hematuria  Hema/Lymph: no abnormal bruising or bleeding  Endocrine: no heat or cold intolerance, no excessive thirst or excessive urination  Musculoskeletal: no muscle or joint pain, no joint swelling  Integumentary: no skin rash or abnormal lesion, no jaundice  Neurologic: no headache, no dizziness, no numbness?  Physical Exam  Vitals & Measurements  T:?36.7? ?C (Oral)? TMIN:?36.1? ?C (Temporal Artery)? TMAX:?36.8? ?C (Oral)? HR:?99(Peripheral)? RR:?18? BP:?119/77? SpO2:?100%?  General:?Well-developed well-nourished BM?in no acute distress  Eye: PERRLA, EOMI, clear conjunctiva, eyelids normal  HENT:?oropharynx without erythema/exudate, oropharynx and nasal mucosal surfaces moist, no thrush, Deaf RT ear, Tribe LT ear  Neck: full range of motion, no thyromegaly or lymphadenopathy, no JVD  Respiratory:?Breathing unlabored. Lungs clear to auscultation bilaterally  Cardiovascular:?regular rate and rhythm without murmurs, gallops or rubs  Gastrointestinal:?soft, non-tender, non-distended with normal bowel sounds, without masses to palpation  Genitourinary: no CVA tenderness to palpation  Musculoskeletal:?full range of motion of all extremities/spine without limitation or discomfort  Integumentary: no rashes or skin lesions present, rectal dressing  Neurologic: cranial nerves grossly intact  Assessment/Plan  1.?HIV disease?B20  Followed by Dr INDIO Dougherty (had been out of care at least a year prior to last hospitalization in January)  Missed follow up ID clinic appointments  On Biktarvy for ART  1/2/20 Last CD4 count was 115 with HIV VL 06106 (CD4 naiter of 3 in 2016)  Ordered:  .Cd4 Lymphocytes, Now collect, 05/06/20 14:59:00 CDT, Blood, Stop date 05/06/20 15:07:00 CDT, Lab Collect, HIV disease, 05/06/20 14:59:00 CDT  CBC w/ Auto Diff, Now  collect, 05/06/20 14:59:00 CDT, Blood, Stop date 05/06/20 15:07:00 CDT, Lab Collect, HIV disease, 05/06/20 14:59:00 CDT  Cryptococcus Antigen, Serum-[facility] [id number], Now collect, 05/06/20 15:00:00 CDT, Blood, Stop date 05/06/20 15:07:00 CDT, Lab Collect, HIV disease, 05/06/20 15:00:00 CDT  Hepatitis Panel [facility], Now collect, 05/06/20 15:01:00 CDT, Blood, Stop date 05/06/20 15:07:00 CDT, Lab Collect, HIV disease, 05/06/20 15:01:00 CDT  PhenoSense Integrase Inhibitor-[facility], Now collect, 05/06/20 15:00:00 CDT, Blood, Stop date 05/06/20 15:07:00 CDT, Lab Collect, HIV disease, 05/06/20 15:00:00 CDT  RNA, PCR(NonGraph)rfx/GenoPRIme(R)-[facility] [id number], Now collect, 05/06/20 15:08:00 CDT, Blood, Stop date 05/06/20 15:08:00 CDT, Lab Collect, HIV disease, 05/06/20 15:08:00 CDT  Syphilis Antibody (with Reflex RPR), Now collect, 05/06/20 15:02:00 CDT, Blood, Stop date 05/06/20 15:07:00 CDT, Lab Collect, HIV disease, 05/06/20 15:02:00 CDT  T Spot Test for M. tuberculosis, Now collect, 05/06/20 15:00:00 CDT, Blood, Stop date 05/06/20 15:07:00 CDT, Lab Collect, HIV disease, 05/06/20 15:00:00 CDT  Toxoplasma gondii by PCR-[facility] [id number], Now collect, 05/06/20 15:00:00 CDT, Blood, Stop date 05/06/20 15:07:00 CDT, Lab Collect, HIV disease, 05/06/20 15:00:00 CDT  Toxoplasma gondii IgG Antibody Quant-[facility] [id number], Now collect, 05/06/20 15:00:00 CDT, Blood, Stop date 05/06/20 15:07:00 CDT, Lab Collect, HIV disease, 05/06/20 15:00:00 CDT  Toxoplasma gondii IgM Antibody [facility] [id number], Now collect, 05/06/20 15:00:00 CDT, Blood, Stop date 05/06/20 15:07:00 CDT, Lab Collect, HIV disease, 05/06/20 15:00:00 CDT  ?  2.?Rectal mass?K62.89  ?  3.?Lesion of liver?K76.9  ?  4.?Adrenal nodule?E27.8  ?  ?  5/4/20 Blood culture NGTD  5/5/20 Blood culture NGTD  ?  HIV positive male with history of perirectal abscesses that presented with progressive pain and pus / blood in stool.? Upon imaging, pt was found to have large indeterminate enhancing  distal perirectal / anal masslike lesion, along with new liver lesions and RT adrenal nodules.? The liver lesions and adrenal nodules are new.? MRI pelvis has been ordered.? Pt just returned from OR.? Operative report shows concerns for large, fungating anal mass along the LT and posterior anal margin.? Biopsies were?done and pending.? Last CD4 count was 115 withHIV VL [id number] on 1/2/20.? Pt missed follow up ID appointment in 1/30/20 with Dr INDIO Dougherty and also telephone?visit on 4/16/20.? Pt never completed prior HIV workup ordered; will order today.? HIV VL already ordered.? ID will add CD4.? CD4 naiter of 3 in 2016.??ART was changed on last hospitalization to Biktarvy to obtain better HIV control.? Pt confirms this.? He is no longer on Descovy and Prezcobix.? Previous KENDRICK resolving.? Practice renal protective measures.? Afebrile.? No leukocytosis noted.? Pt is on Cefepime, Vancomycin, and Flagyl without issue.??  ?   At this juncture, would D/C Prezcobix.? Continue Biktarvy 1 tab po daily for ART.? Would also continue Cefepime 2 grams q 12 hrs (renal dose), Vancomycin IV (trough 15-20), and Flagyl 500 mg IV q 8 hrs.   Problem List/Past Medical History  Ongoing  Anxiety state(  Confirmed  )  Depressive disorder(  Confirmed  )  HIV  Mononeuritis(  Confirmed  )  Toxoplasmosis(  Confirmed  )  Historical  No qualifying data  Procedure/Surgical History  Biopsy (SURG) (None) (05/06/2020)  Exam Under Anesthesia Rectal (None) (05/06/2020)  Drainage of Rectum with Drainage Device, Percutaneous Approach (01/03/2020)  Extraction of Buttock Subcutaneous Tissue and Fascia, Percutaneous Approach (01/03/2020)  Incision & Drainage Rectal Abscess (None) (01/03/2020)   Medications  Inpatient  acetaminophen, 500 mg= 1 tab(s), Oral, q6hr, PRN  acetaminophen, 500 mg= 1 tab(s), Oral, q6hr, PRN  acetaminophen-hydrocodone 325 mg-7.5 mg oral tablet, 1 tab(s), Oral, q4hr, PRN  azithromycin 600 mg oral tablet, 1200 mg= 2 tab(s), Oral,  qWeek  Biktarvy oral tablet, 1 tab, Oral, Daily  cefepime (for IVPB)  Flagyl 500 mg / 100 ml premix, 500 mg= 100 mL, IV Piggyback, [id number]  gabapentin 300 mg oral capsule, 600 mg= 2 cap(s), Oral, TID  hydrOXYzine pamoate 25 mg oral capsule, 25 mg= 1 cap(s), Oral, q6hr, PRN  IVF Lactated Ringers LR Infusion 1,000 mL, 1000 mL, IV  Lovenox, 40 mg= 0.4 mL, Subcutaneous, Daily  Microcyn Topical Spray, 1 darryl, TOP, TID, PRN  morphine, 3 mg= 0.75 mL, IV Push, As Directed, PRN  Prezcobix 800 mg-150 mg oral tablet, 1 tab(s), Oral, Daily  traMADol, 50 mg= 1 tab(s), Oral, q4hr, PRN  vancomycin, 750 mg= 150 mL, IV Piggyback, q18hr  zolpidem 10 mg sublingual tablet, 10 mg= 1 tab(s), Oral, Once a day (at bedtime)  Home  acetaminophen-hydrocodone 325 mg-7.5 mg oral tablet, 1 tab(s), Oral, TID  alPRAzolam 1 mg oral tablet  azithromycin 600 mg oral tablet, 1200 mg= 2 tab(s), Oral, qWeek, 1 refills,? ?Not taking  azithromycin 600 mg oral tablet, 1200 mg= 2 tab(s), Oral, qWeek, 1 refills,? ?Not taking  benzocaine 5% topical cream, 1 darryl, TOP, QID, PRN,? ?Not taking  Biktarvy oral tablet, 1 tab(s), Oral, Daily  Biktarvy oral tablet, 1 tab(s), Oral, Daily, 1 refills  ciprofloxacin 500 mg oral tablet, 500 mg= 1 tab(s), Oral, BID,? ?Not taking  fluconazole 200 mg oral tablet, 200 mg= 1 tab(s), Oral, Daily,? ?Not taking  gabapentin 300 mg oral capsule, 300 mg= 1 cap(s), Oral, TID  hydrOXYzine hydrochloride 25 mg oral tablet, 25 mg= 1 tab(s), Oral, QID, PRN,? ?Not taking  leucovorin 10 mg oral tablet, 10 mg= 1 tab(s), Oral, Daily, 1 refills,? ?Not taking  Megace 40 mg/mL oral suspension, 800 mg= 20 mL, Oral, Daily, 3 refills  methocarbamol 750 mg oral tablet, 1500 mg= 2 tab(s), Oral, TID,? ?Not taking  metronidazole 500 mg oral tablet, 500 mg= 1 tab(s), Oral, q8hr,? ?Not taking  Narcan 4 mg/0.1 mL nasal spray, 4 mg, Nasal, Once,? ?Not taking  oxycodone 5 mg oral tablet, 5 mg= 1 tab(s), Oral, q6hr,? ?Not taking  Prezcobix 800 mg-150 mg  oral tablet, 1 tab(s), Oral, Daily, 3 refills,? ?Not taking  pyrimethamine 25 mg oral tablet, 75 mg= 3 tab(s), Oral, Daily, 1 refills,? ?Not taking  sulfADIAZINE 500 mg oral tablet, 750 mg= 1.5 tab(s), Oral, q6hr, 1 refills,? ?Not taking  sulfamethoxazole-trimethoprim 800 mg-160 mg oral tablet, 2 tab(s), Oral, BID,? ?Not taking  traMADol 50 mg oral tablet, 50 mg= 1 tab(s), Oral, q4hr,? ?Not taking  zolpidem 10 mg oral tablet, 10 mg= 1 tab(s), Oral, Once a day (at bedtime), 1 refills,? ?Not taking  Allergies  No Known Medication Allergies  Social History  Abuse/Neglect  No, No, Yes, 05/05/2020  Alcohol  Past, 06/10/2015  Employment/School  Disabled, 01/16/2020  Home/Environment  Lives with Children, Spouse. Living situation: Home/Independent., 01/16/2020  Nutrition/Health  Regular, Good, 01/16/2020  Spiritual/Cultural  Alevism, 01/16/2020  Substance Use - Denies Substance Abuse, 05/06/2015  Past, Marijuana, 06/10/2015  Tobacco - Denies Tobacco Use, 04/09/2015  Former smoker, quit more than 30 days ago, N/A, Household tobacco concerns: No., 05/05/2020  Family History  Cardiac arrest.: Brother.  Immunizations  Vaccine Date Status   pneumococcal 7-valent vaccine 01/03/2011 Recorded   Lab Results  Test Name Test Result Date/Time Comments   Sodium Lvl 138 mmol/L 05/06/2020 03:55 CDT    Potassium Lvl 4.1 mmol/L 05/06/2020 03:55 CDT    Chloride 111 mmol/L (High) 05/06/2020 03:55 CDT    CO2 18 mmol/L (Low) 05/06/2020 03:55 CDT    Calcium Lvl 9.2 mg/dL 05/06/2020 03:55 CDT    Glucose Lvl 91 mg/dL 05/06/2020 03:55 CDT    BUN 17 mg/dL 05/06/2020 03:55 CDT    Creatinine 1.20 mg/dL 05/06/2020 03:55 CDT    eGFR-AA 84 mL/min (Low) 05/06/2020 03:55 CDT    eGFR-SEBASTIAN 69 mL/min (Low) 05/06/2020 03:55 CDT    Bili Total 0.3 mg/dL 05/06/2020 03:55 CDT    AST 23 unit/L 05/06/2020 03:55 CDT    ALT 8 unit/L (Low) 05/06/2020 03:55 CDT    Alk Phos 38 unit/L (Low) 05/06/2020 03:55 CDT    CEA 0.6 ng/mL 05/06/2020 03:55 CDT 95.4% of healthy,  non-smoking subjects may be expected to have CEA titers of less than 3.0 ng/mL   WBC 8.5 x10(3)/mcL 05/06/2020 03:55 CDT    Hgb 8.0 gm/dL (Low) 05/06/2020 03:55 CDT    Hct 24.6 % (Low) 05/06/2020 03:55 CDT    Platelet 245 x10(3)/mcL 05/06/2020 03:55 CDT    CD4 Pct 7.3 % (Low) 01/02/2020 16:30 CST    CD4 Absolute 115 unit/L (Low) 01/02/2020 16:30 CST    HIV-1 RNA by PCR-LC 61890 cpy/mL 01/02/2020 21:53 CST ?  The reportable range for this assay is 20 to 10,000,000  copies HIV-1 RNA/mL.   log10 HIV-1 RNA-LC 4.072 LogCopies/mL 01/02/2020 21:53 CST Performed At: [address] [id number]  Kulwinder Martinez MD Ph:[phone]   SARS-CoV-2 PCR Not Detected 05/05/2020 17:00 CDT Negative results do not preclude SARS-CoV-2 infection and should not be used as the sole basis for treatment or other patient management decisions. Negative results must be combined with clinical observations, patient history, and epidemiological information.  ?  ?  Result created by Discern Rule.   Diagnostic Results  (05/04/2020 23:25 CDT CT Abdomen and Pelvis W Contrast)  History.  Rectal mass.  ?  Reference.  2 January 2020  ?  Technique.  Helical acquisition through the abdomen and pelvis with IV contrast.  Three plane reconstructions were provided for review.  mGycm.  Automatic exposure control, adjustment of mA/kV or iterative  reconstruction technique was used to reduce radiation.  ?  Findings.  The limited imaged lung bases are clear.  ?  There are hepatic hypodensities which are new compared to the prior  exam. The largest in the right lobe on image 6 series 2 measures 10  mm. Gallbladder is distended with gallstones. No significant  abnormality of the spleen or pancreas. There is new right adrenal  nodule which is indeterminate measuring about 17 mm. No  hydronephrosis.?  ?  There is no bowel obstruction. There is distal perirectal/anal  irregular enhancing mass lesion measuring up to about 8 cm  image 85  series 2. Few small perirectal lymph nodes are seen more superiorly.  These are new compared to prior.  ?  Urinary bladder unremarkable. No ascites. Abdominal aorta normal in  caliber. Only small retroperitoneal lymph nodes are seen.?  ?  No acute osseous findings.  ?  Impression.  1. Large indeterminate enhancing distal perirectal/anal masslike  lesion.  2. Liver lesions and right adrenal nodules are indeterminate and new  compared to January 2020.  3. Other findings above.  ?   (05/05/2020 13:57 CDT CT Abdomen W W/O Contrast)  EXAMINATION  CT Abdomen W W/O Contrast  ?  TECHNIQUE  ?? ? Helical-acquisition CT images were obtained prior to and  following the intravenous administration of iodinated contrast media.  Enteric contrast was not utilized.  ?? ? Multiplanar reformats were accomplished by a CT technologist at a  separate workstation and pushed to PACS for physician review.  ?  Total DLP (mGy-cm): 1414.2  (value may include radiation due to concomitantly performed CT  imaging)  ?? ? Automated tube current modulation and/or weight-based exposure  dosing is utilized, when appropriate, to reach lowest reasonably  achievable exposure rate.  ?  INDICATION  Recently identified liver lesions, repeat imaging for additional  characterization  ?  Comparison: 4 May, 2020  ?  FINDINGS  Images were reviewed in soft tissue, lung, and bone windows.  ?  Exam quality: adequate  ?  Lines/tubes: None visualized.  ?  Circumscribed low density focus measuring approximately 1.1 cm is  present at the hepatic dome (series 4, image 10); no gross enhancement  is appreciated, with characterization otherwise limited by small size  additional subcentimeter low-attenuation lesions are scattered through  the right lobe, similar to the prior study and again remaining  indeterminate secondary to being too small for detailed  characterization.  ?  The right adrenal nodule as internal density of approximately 47 HU on  precontrast  imaging, with arterial phase enhancement resulting in  increased density to 59 HU. The delayed/portal venous phase density is  89 HU, with 5-minute delayed series producing density of 73 HU.  Evaluation of washout characteristics is limited due to nonstandard  timing for adrenal protocol, but calculated values are indeterminate.  ?  The included lower thoracic cavity, the remaining upper abdominal  solid organs, and the visualized gastrointestinal tract are without  short-interval change.  ?  IMPRESSION  1. ?Redemonstrated multifocal low density hepatic lesions are grossly  unchanged. As before, appearance is indeterminate and characterization  limited secondary to small size.  2. ?Washout characteristics of the right adrenal nodule are  indeterminate due to suboptimal contrast timing (adrenal protocol was  not specified/utilized).  3. ?Remaining findings demonstrate no short-interval change.  ?   (05/05/2020 20:01 CDT CT Thorax W Contrast)  History.  Toxoplasmosis  ?  Reference.  No priors  ?  Technique.  Helical acquisition through the chest performed with IV contrast.  Three plane reconstructions made available for review.  mGycm.  Automatic exposure control, adjustment of mA/kV or iterative  reconstruction technique was used to reduce radiation.  ?  Findings.  There is no mediastinal, hilar or axillary lymphadenopathy by size  criteria. There is gynecomastia.  ?  Heart is normal in size. Aorta and pulmonary artery are normal in  caliber. There is no pericardial effusion.  ?  There is no pleural effusion. There is no suspicious pulmonary nodule.  No opacities suspicious for pneumonia.  ?  Liver lesions are again noted as seen on prior CT of the abdomen.  There are gallstones. There is indeterminate right adrenal nodule.  Mild degenerative change of the spine.  ?  Impression.  No significant CT abnormality of the thorax.      Patient discussed with NP, was actively involved in treatment plan and plan of  care delivery. Treatment plan reviewed and discussed with NP and is reasonable and appropriate. ?

## 2022-09-13 NOTE — HISTORICAL [facility]ER
This is a historical note converted from Nancy. Formatting and pictures may have been removed.  Please reference Nancy for original formatting and attached multimedia. Chief Complaint  States abdominal and rectal pain x 10 days. ?Was seen at [facility] [place] and wife ejected from hospital for asking too many questions. ?States her efor p[ain and rectal pain with ulceration.  Reason for Consultation  HIV, HIV?medication non-compliance  History of Present Illness  44 y o male with pmh significant for HIV (dx in 2010), hx of CNS toxoplasmosis who presents to ED 2/2 rectal pain.? Patient reports rectal pain x 10 days that is dull and throbbing.? Patient reports?pain is aggravated?by?sitting.? He denies any pain?with bowel movements, melena or hematochezia.? Patient reports?associated purulent?discharge?that began today.? He denies any?fevers, chills,?shortness of breath, cough, chest pain, nausea, vomiting,?headache,?lightheadedness, dizziness, or?syncopal episodes. He denies any complaints other than rectal pain at this time. CT abdomen and pelvis with contrast showed distal perirectal fluid collection suspicious for abscess measuring 5.3 x 4.6?cm. Surgery was consulted for rectal abscess, and will?take patient?to?OR?tomorrow morning for?sx intervention. ?Internal medicine was consulted by sx?for recommendations on?HIV?medications.?  ?  Wife was?present and provided the?majority of history. They denied?any significant past medical history other than?HIV(diagnosed in 2010). ?Per chart, patient has a history of CNS toxoplasmosis, medication non-compliance.? Per Nancy, patient was last seen in clinic on 11/4/2016 at which time his most recent CD4?count was 3 with CD4 percent 0.3, viral load of 303,850 on 8/6/16. He was placed on Descovy, Prezcobix and prophylactically treated with azithromycin, promethazine?and sulfadiazine. The most recent?Absolute CD4 on file is 137 with CD4 percent 6.8. ?However, patient wife reports?he has  been?seen more /recently?by Dr. Dougherty?in 7/2019. ?Per review of chart?patient was?last prescribed Descovy on 7/1/19. However, per patient wife, patient has been off of? any medication since that time.  ?  PMH: HIV (dx 2010),?CNS toxoplasmosis  Past surgical history:?Denies  Medications:?Patient and wife?report patient?is not currently on any medications.? She states that?she has been off of?HIV medications?since July 2019.  Allergies:?No known drug allergies  Social history:?Patient smokes 3 marijuana cigarettes?daily.? He denied any?tobacco/alcohol/IV drug use.  Review of Systems  Constitutional:?No fever or chills, fatigue, unintentional weight loss  Eyes: No changes in vision  Ears:?No tinnitus. +?hearing loss (chronic)  Nose:?No rhinitis,?no postnasal drip  Lungs: No shortness of breath, no dyspnea on exertion, no?wheezing, no stridor  Heart:?No chest pain, no palpitations,?no?syncopal episodes  Abdomen:?No abdominal pain,?no?nausea, no vomiting,?no diarrhea,?no melena, hematochezia  GI/: No frequency no dysuria. +Rectal pain  MSK:?No muscular pain,?no?joint pain/swelling/erythema  Extremities:?Normal strength,?normal ROM,?no swelling  Integumentary:?+ diffuse macular hyperpigmented lesions (patient states treated for psoriasis), no excoriation,?no cyanosis,?no erythema,?no jaundice  Neuro:?No headache, no dizziness, no lightheadedness, no?gait disturbance,?no?balance disturbances  Psych:?No depression, no auditory hallucination, no?visual hallucination, no?SI/HI  Physical Exam  Vitals & Measurements  T:?37.2? ?C (Oral)? TMIN:?37.2? ?C (Oral)? TMAX:?37.4? ?C (Oral)? HR:?87(Peripheral)? RR:?16? BP:?110/59? SpO2:?100%? WT:?75.95?kg?  General: Alert and oriented, No acute distress,  HEENT: PERRLA, EOMI, decreased hearing, Oral mucosa moist  Respiratory:CTABL, No c/r/w, Respirations non-labored, Breath sounds equal.  Cardiovascular: Normal rate, Regular rhythm, No m/r/g  Gastrointestinal: Soft, NT, ND, multiple  healed scars and faint red ink pen writing on abdomen  GI/GI: +perianal tenderness, +purulent discharge  Musculoskeletal: Normal range of motion, Normal strength, No swelling, No deformity.  Integumentary: Warm, Dry, Intact.diffuse macular hyperpigmented lesions on Bilateral Upper and lower extremities and trunk.  Neurologic: Alert, Oriented, No gross?focal deficits. diminished hearing  Psychiatric: Cooperative  ?   Labs Last 24 Hours?  ?Chemistry? Hematology/Coagulation?   Sodium Lvl:?131 mmol/L?Low (01/02/20 16:30:00) PT: 13.7 second(s) (01/02/20 16:30:00)   Potassium Lvl: 3.6 mmol/L (01/02/20 16:30:00) INR: 1.06 (01/02/20 16:30:00)   Chloride: 100 mmol/L (01/02/20 16:30:00) PTT: 32.9 second(s) (01/02/20 16:30:00)   CO2: 25 mmol/L (01/02/20 16:30:00) WBC:?13.4 x10(3)/mcL?High (01/02/20 16:30:00)   Calcium Lvl: 9.6 mg/dL (01/02/20 16:30:00) RBC: 4.76 x10(6)/mcL (01/02/20 16:30:00)   Magnesium Lvl: 2.4 mg/dL (01/02/20 16:30:00) Hgb: 14.4 gm/dL (01/02/20 16:30:00)   Glucose Lvl: 95 mg/dL (01/02/20 16:30:00) Hct: 43.5 % (01/02/20 16:30:00)   BUN: 13 mg/dL (01/02/20 16:30:00) Platelet: 141 x10(3)/mcL (01/02/20 16:30:00)   Creatinine: 1.1 mg/dL (01/02/20 16:30:00) MCV: 91.4 fL (01/02/20 16:30:00)   eGFR-AA: 93 mL/min (01/02/20 16:30:00) MCH: 30.3 pg (01/02/20 16:30:00)   eGFR-SEBASTIAN:?77 mL/min?Low (01/02/20 16:30:00) MCHC: 33.1 gm/dL (01/02/20 16:30:00)   Bili Total: 0.5 mg/dL (01/02/20 16:30:00) RDW: 12 % (01/02/20 16:30:00)   Bili Direct: 0.2 mg/dL (01/02/20 16:30:00) MPV:?13.4 fL?High (01/02/20 16:30:00)   Bili Indirect: 0.3 mg/dL (01/02/20 16:30:00) Abs Neut:?9.36 x10(3)/mcL?High (01/02/20 16:30:00)   AST:?12 unit/L?Low (01/02/20 16:30:00) Neutro Auto: 70 % (01/02/20 16:30:00)   ALT: 15 unit/L (01/02/20 16:30:00) Lymph Auto: 19 % (01/02/20 16:30:00)   Alk Phos: 60 unit/L (01/02/20 16:30:00) Mono Auto: 10 % (01/02/20 16:30:00)   Total Protein:?9.7 gm/dL?High (01/02/20 16:30:00) Eos Auto: 0 % (01/02/20 16:30:00)    Albumin Lvl:?3.3 gm/dL?Low (01/02/20 16:30:00) Abs Eos: 0 x10(3)/mcL (01/02/20 16:30:00)   Globulin:?6.4 gm/mL?High (01/02/20 16:30:00) Basophil Auto: 0 % (01/02/20 16:30:00)   A/G Ratio:?0.5 ratio?Low (01/02/20 16:30:00) Abs Neutro:?9.36 x10(3)/mcL?High (01/02/20 16:30:00)   Lactic Acid Lvl: 1 mmol/L (01/02/20 18:25:00) Abs Lymph: 2.5 x10(3)/mcL (01/02/20 16:30:00)   Lipase Lvl: 75 unit/L (01/02/20 16:30:00) Abs Mono: 1.3 x10(3)/mcL (01/02/20 16:30:00)   ? Abs Baso: 0 x10(3)/mcL (01/02/20 16:30:00)   ? IG%: 1 % (01/02/20 16:30:00)   ? IG#: 0.12 (01/02/20 16:30:00)   ?  ?   IMAGING  ?   XR Chest 2 Views  Report Dt/Tm:?01/02/2020 18:04  Report:?  ?  History:  Abdominal pain. HIV.  ?  Reference:  12 July 2016  ?  Findings:  Frontal and lateral views of the chest were obtained. Heart is not  enlarged. Lungs are clear. No pneumothorax or significant effusion.  ?  Impression:?  No acute cardiopulmonary abnormality.  ?  ?  CT Abdomen and Pelvis W Contrast  Report Dt/Tm:?01/02/2020 18:03  Report:?  ?  History.  Abdominal pain.  ?  Reference.  No priors  ?  Technique.  Helical acquisition through the abdomen and pelvis with IV contrast.  Three plane reconstructions were provided for review.  mGycm.  Automatic exposure control, adjustment of mA/kV or iterative  reconstruction technique was used to reduce radiation.  ?  Findings.  The limited imaged lung bases are clear.  ?  No significant abnormality the liver, spleen, pancreas or adrenals. No  hydronephrosis. Small left renal hypodensity most likely a cyst. There  are gallstones.?  ?  There is no bowel obstruction. There is distal perirectal fluid  collection measuring 53 x 46 mm on image 91 series 2. There is  extension of fluid collection into left gluteal fat on image 86-89  series 2. No significant inflammatory changes of the bowel elsewhere.  The appendix is normal.  ?  There is bladder wall thickening. No significant free fluid. The  abdominal aorta is normal  in caliber.  ?  No acute osseous findings  ?  Impression.  Distal perirectal fluid collection suspicious for abscess.  ?  ?  Assessment/Plan  44 y o male with hx of HIV and medication non-compliance that presents to ED with rectal pain, found to have perirectal abscess, and admitted to Surgery for abscess drainage.  ?   Rectal Pain  Perirectal abscess  hx of HIV and medication non-compliance  Hyponatremia (131)  -Recommend admission to surgery for surgical intervention of perirectal abscess drainage  -Continue IV Abx?per primary team  -Multimodal pain control per primary  -Continue NPO for sx intervention in am  -Monitor Electrolytes, continue with IVF hydration  -F/U absolute?CD4?count, viral load,?hepatitis panel,?syphilis  -Consult ID in AM for patient HIV f/u?  ?   Problem List/Past Medical History  Ongoing  Anxiety state(  Confirmed  )  Depressive disorder(  Confirmed  )  HIV  Mononeuritis(  Confirmed  )  Toxoplasmosis(  Confirmed  )  Historical  No qualifying data  Medications  Inpatient  No active inpatient medications  Home  acetaminophen-hydrocodone 325 mg-7.5 mg oral tablet, 1 tab(s), Oral, TID  alPRAzolam 1 mg oral tablet  azithromycin 600 mg oral tablet, 1200 mg= 2 tab(s), Oral, qWeek, 1 refills  azithromycin 600 mg oral tablet, 1200 mg= 2 tab(s), Oral, qWeek, 1 refills  benzocaine 5% topical cream, 1 darryl, TOP, QID, PRN  Descovy 200 mg-25 mg oral tablet, 1 tab(s), Oral, Daily, 3 refills,? ?Not taking  hydrOXYzine hydrochloride 25 mg oral tablet, 25 mg= 1 tab(s), Oral, QID, PRN  leucovorin 10 mg oral tablet, 10 mg= 1 tab(s), Oral, Daily, 1 refills,? ?Not taking  Megace 40 mg/mL oral suspension, 800 mg= 20 mL, Oral, Daily, 3 refills  Prezcobix 800 mg-150 mg oral tablet, 1 tab(s), Oral, Daily, 3 refills,? ?Not taking  pyrimethamine 25 mg oral tablet, 75 mg= 3 tab(s), Oral, Daily, 1 refills  sulfADIAZINE 500 mg oral tablet, 750 mg= 1.5 tab(s), Oral, q6hr, 1 refills  Vitamin D 50,000 intl units oral  capsule, 70172 IntUnit= 1 cap(s), Oral, 2x/Wk, 1 refills  zolpidem 10 mg oral tablet, 10 mg= 1 tab(s), Oral, Once a day (at bedtime), 1 refills  Allergies  No Known Medication Allergies  Social History  Abuse/Neglect  No, 01/02/2020  Alcohol  Past, 06/10/2015  Substance Use - Denies Substance Abuse, 05/06/2015  Past, Marijuana, 06/10/2015  Tobacco - Denies Tobacco Use, 04/09/2015  Former smoker, quit more than 30 days ago, N/A, 01/02/2020  Family History  Cardiac arrest.: Brother.      PGYII Addendum  ?   Patient is a 44-year-old male with past medical history of HIV diagnosed in 2010?and?history of CNS toxoplasmosis who presented to the ED with 10 days of rectal pain. ?Reportedly began to have purulent discharge that began today.?Denies Initially went to hospital, but ended up leaving after Francisco kicked out for reportedly asking to any questions. ?Patient denies any fevers, chills, shortness of breath, cough, chest pain, vomiting, abdominal pain, weakness, fatigue, headache or change in vision. ?Patient had CT abdomen and pelvis which showed distal perirectal fluid collection suspicious for abscess. ?Surgery admitted patient for perirectal abscess drainage tomorrow. ?Medicine was consult in given patients history of HIV and patient had not been taking medications since July 2019. ?Previously prescribed Descovy, Prezcobix and prophylactically treated with azithromycin, promethazine?and sulfadiazine; last filled Descovy 7/1/19; had not taken any medication since then. Absolute CD4 on file is 137 with CD4 percent 6.8 in August 2018.  ?  Physical exam was significant for perianal abscess with ulceration and obvious purulent discharge, diffuse macular nonelevated hyperpigmented lesions diffusely across body most prominent on lower extremities; nonerythematous, nonblanching, nonulcerating?. Patient has also diminished hearing bilaterally.  Surgery admitted for surgical intervention of his rectal abscess, started on  abx. ?We have ordered CD4 count, viral load ,hep panel, RPR, and consult ID in a.m.  ?   Patient seen on rounds this morning.? Went to the OR this a.m.?for I&D.? Tolerated well.? CD4 count?115,?hep panel negative,?syphilis antibody negative,?viral load pending.? ID on board,?restarting?ART. ?Further management per primary team.? Will need?PCP?upon discharge.

## 2022-09-13 NOTE — HISTORICAL [facility] CERNER
This is a historical note converted from [place]. Formatting and pictures may have been removed.  Please reference [place] for original formatting and attached multimedia. Chief Complaint  States abdominal and rectal pain x 10 days. ?Was seen at [facility] [place] and wife ejected from hospital for asking too many questions. ?States her efor p[ain and rectal pain with ulceration.  Reason for Consultation  HIV  History of Present Illness  Mr Hill is a 44 yr old HIV positive BM admitted for c/o rectal pain.? Symptoms began about 10 days prior to admission and continued to worsen.? Pt was found to have qiana-rectal abscess upon CT imaging.? He just returned from surgery.? Pt has been out of care with HIV for some time.? Wife reports he has been off of ART since July 2019.? He was previously seen in clinic by Dr. Velazquez.? He was scheduled with Angelika Bernal NP, but was a no show for his visit.? Last ART regimen he was on was of Descovy and Prezcobix.? Pt also has a history of Toxo to which?wife reports he completed treatment, but has had memory issues since.? Last CD4 was 137 with VL [id number] on 8/2018.? CD4 naiter of 3 in 2016.??Pt currently on Cipro and Flagyl without issue.? ID has been consulted for HIV.  Review of Systems  Constitutional: no fever, + fatigue, + weakness, + chills, + night sweats  Eye: no vision loss, eye redness, drainage, or pain, no yellowing of the eyes  ENMT: no sore throat, ear pain, sinus pain/congestion, nasal congestion/drainage  Respiratory: no cough, no wheezing, no shortness of breath  Cardiovascular: no chest pain, no palpitations, no edema  Gastrointestinal: no nausea, vomiting, or diarrhea. No abdominal pain, no david colored stools, + rectal pain, no change in BMs, no fluid drainage from anus, no fecal incontinence  Genitourinary: no dysuria, no urinary frequency or urgency, no hematuria  Hema/Lymph: no abnormal bruising or bleeding  Endocrine: no heat or cold intolerance, no excessive thirst or excessive  urination  Musculoskeletal: no muscle or joint pain, no joint swelling  Integumentary: no skin rash or abnormal lesion, no jaundice  Neurologic: no headache, no dizziness, no weakness or numbness?  Physical Exam  Vitals & Measurements  T:?36.6? ?C (Temporal Artery)? TMIN:?36? ?C (Temporal Artery)? TMAX:?37.4? ?C (Oral)? HR:?65(Monitored)? RR:?20? BP:?98/64? SpO2:?100%? WT:?75.95?kg? BMI:?27.9?  General:?Well-developed well-nourished BM in no acute distress; minimal conversation  Eye: PERRLA, EOMI, clear conjunctiva, eyelids normal  HENT:?oropharynx without erythema/exudate, oropharynx dry, no maxillary/frontal sinus tenderness to palpation  Neck: full range of motion, no thyromegaly or lymphadenopathy  Respiratory:?Breathing unlabored. Lungs clear to auscultation bilaterally  Cardiovascular:?regular rate and rhythm without murmurs, gallops or rubs  Gastrointestinal:?soft, non-tender, non-distended with normal bowel sounds, without masses to palpation  Genitourinary: no CVA tenderness to palpation  Musculoskeletal:?full range of motion of all extremities/spine without limitation or discomfort  Integumentary: no rashes, dressing to buttocks  Neurologic: cranial nerves grossly intact  Assessment/Plan  1.?Perirectal abscess?K61.1  2.?HIV disease?B20  3.?History of toxoplasmosis?Z86.19  ?  1/2/20 Blood cultures pending  1/3/19 Wound cultures pending  ?  Overall, pt just returned from surgery and seems to be doing well.? Wound culture were taken and remain pending.? Will continue to follow cultures.? Okay for pt to be discharged on Flagyl 500 mg po TID x 10 days and Bactrim DS 2 tabs po BID (will continue this at least until he is seen in clinic).? From an HIV standpoint, he has been out of care.? He minimally speaks upon exam and unsure if is part of his normal state or some sedation left from surgery.? Long discussion with wife about both of them returning to care.? No one (family and / or friends) really aware of their  definitive HIV diagnoses.? Wife alludes that assumptions of their diagnosis?have been made in the community, but nothing they have not confirmed anything themselves and wife does not want anyone to know yet.? Pt okay from ID standpoint on discharge after labs are done.? Sent RX for Biktarvy 1 tab po daily to [facility] for pt.? Pt set up with ID appointment on 1/30/20 at 1500 with Dr. INDIO Dougherty already.? Further ID workup has already been ordered to include: Toxo PCR, Toxo IgG, Toxo IgM, PhenoSense, HIV VL, CD4, Cryptococcal Ag, and Tspot.? HIV , Orly Cintron RN, to speak with pt and wife today to do further intake and assess needs.   Problem List/Past Medical History  Ongoing  Anxiety state(  Confirmed  )  Depressive disorder(  Confirmed  )  HIV  Mononeuritis(  Confirmed  )  Toxoplasmosis(  Confirmed  )  Historical  No qualifying data  Procedure/Surgical History  Incision & Drainage Rectal Abscess (None) (01/03/2020)   Medications  Inpatient  Biktarvy 50mg/200mg/25mg, 1 dose, Oral, q24hr  Cipro, 400 mg= 200 mL, IV Piggyback, [id number]  Flagyl 500 mg / 100 ml premix, 500 mg= 100 mL, IV Piggyback, q8hr  IVF D5 ? Normal Saline Infusion 1,000 mL, 1000 mL, IV  Lovenox, 40 mg= 0.4 mL, Subcutaneous, Daily  morphine, 1 mg= 0.5 mL, IV Push, q2hr, PRN  oxyCODONE, 5 mg= 1 tab(s), Oral, q4hr, PRN  oxyCODONE, 10 mg= 2 tab(s), Oral, q4hr, PRN  Protonix, 40 mg= 1 tab(s), Oral, Daily  sulfamethoxazole-trimethoprim, 2 tab(s), Oral, BID  Toradol 30 mg for IV Push, 15 mg= 0.5 mL, IV Push, q6hr, PRN  Tylenol, 1000 mg= 2 tab(s), Oral, q8hr  Ultram, 50 mg= 1 tab(s), Oral, q4hr, PRN  Home  acetaminophen-hydrocodone 325 mg-7.5 mg oral tablet, 1 tab(s), Oral, TID  alPRAzolam 1 mg oral tablet  azithromycin 600 mg oral tablet, 1200 mg= 2 tab(s), Oral, qWeek, 1 refills  azithromycin 600 mg oral tablet, 1200 mg= 2 tab(s), Oral, qWeek, 1 refills  benzocaine 5% topical cream, 1 darryl, TOP, QID, PRN  Descovy 200 mg-25 mg oral  tablet, 1 tab(s), Oral, Daily, 3 refills,? ?Not taking  hydrOXYzine hydrochloride 25 mg oral tablet, 25 mg= 1 tab(s), Oral, QID, PRN  leucovorin 10 mg oral tablet, 10 mg= 1 tab(s), Oral, Daily, 1 refills,? ?Not taking  Megace 40 mg/mL oral suspension, 800 mg= 20 mL, Oral, Daily, 3 refills  Prezcobix 800 mg-150 mg oral tablet, 1 tab(s), Oral, Daily, 3 refills,? ?Not taking  pyrimethamine 25 mg oral tablet, 75 mg= 3 tab(s), Oral, Daily, 1 refills  sulfADIAZINE 500 mg oral tablet, 750 mg= 1.5 tab(s), Oral, q6hr, 1 refills  Vitamin D 50,000 intl units oral capsule, 40793 IntUnit= 1 cap(s), Oral, 2x/Wk, 1 refills  zolpidem 10 mg oral tablet, 10 mg= 1 tab(s), Oral, Once a day (at bedtime), 1 refills  Allergies  No Known Medication Allergies  Social History  Abuse/Neglect  No, 01/02/2020  Alcohol  Past, 06/10/2015  Substance Use - Denies Substance Abuse, 05/06/2015  Past, Marijuana, 06/10/2015  Tobacco - Denies Tobacco Use, 04/09/2015  Former smoker, quit more than 30 days ago, N/A, 01/02/2020  Family History  Cardiac arrest.: Brother.  Lab Results  Test Name Test Result Date/Time Comments   BUN 13 mg/dL 01/03/2020 05:54 CST    Creatinine 1.00 mg/dL 01/03/2020 05:54 CST    eGFR- mL/min 01/03/2020 05:54 CST    Lactic Acid Lvl 1.0 mmol/L 01/02/2020 18:25 CST    WBC 12.1 x10(3)/mcL (High) 01/03/2020 05:54 CST    Hgb 12.0 gm/dL (Low) 01/03/2020 05:54 CST    Hct 36.3 % (Low) 01/03/2020 05:54 CST    Platelet 113 x10(3)/mcL (Low) 01/03/2020 05:54 CST    CD4 Pct 6.8 % (Low) 08/17/2018 11:13 CDT    CD4 Absolute 137 unit/L (Low) 08/17/2018 11:13 CDT    Syphilis Ab Nonreactive 01/02/2020 21:53 CST    Hep A IgM Nonreactive 01/02/2020 21:53 CST    Hep B Core IgM Nonreactive 01/02/2020 21:53 CST    Hep Bs Ag Negative 01/02/2020 21:53 CST    Hep C Ab Nonreactive 01/02/2020 21:53 CST    HIV1 RNA PCR-LC [id number] cpy/mL 08/17/2018 11:13 CDT ?  The reportable range for this assay is 20 to 10,000,000  copies HIV-1 RNA/mL.   log10 HIV-1  RNA -LC 5.182 LogCopies/mL 08/17/2018 11:13 CDT    Diagnostic Results  (01/02/2020 17:52 CST CT Abdomen and Pelvis W Contrast)  History.  Abdominal pain.  ?  Reference.  No priors  ?  Technique.  Helical acquisition through the abdomen and pelvis with IV contrast.  Three plane reconstructions were provided for review.  mGycm.  Automatic exposure control, adjustment of mA/kV or iterative  reconstruction technique was used to reduce radiation.  ?  Findings.  The limited imaged lung bases are clear.  ?  No significant abnormality the liver, spleen, pancreas or adrenals. No  hydronephrosis. Small left renal hypodensity most likely a cyst. There  are gallstones.?  ?  There is no bowel obstruction. There is distal perirectal fluid  collection measuring 53 x 46 mm on image 91 series 2. There is  extension of fluid collection into left gluteal fat on image 86-89  series 2. No significant inflammatory changes of the bowel elsewhere.  The appendix is normal.  ?  There is bladder wall thickening. No significant free fluid. The  abdominal aorta is normal in caliber.  ?  No acute osseous findings  ?  Impression.  Distal perirectal fluid collection suspicious for abscess  ?   (01/02/2020 17:53 CST XR Chest 2 Views)  History:  Abdominal pain. HIV.  ?  Reference:  12 July 2016  ?  Findings:  Frontal and lateral views of the chest were obtained. Heart is not  enlarged. Lungs are clear. No pneumothorax or significant effusion.  ?  Impression:?  No acute cardiopulmonary abnormality.      Patient seen and examined with NP. ?Agree with documented physical exam. ?Treatment plan reviewed and discussed with nurse practitioner and is reasonable and appropriate. ?  ?  Recs as above  Out of HIV care for over a year  Hx of toxoplasmosis and unclear as to treatment status  Starting bactrim ds 2 tabs po bid plus flagyl 500mg po tid in an effort to treat his abscess plus toxo  Start biktarvy 1 po daily in an effort to gain control of his  HIV disease  Adding send away labs as above to include toxo pcr and serologies  Plan to treat as above otherwise  ?  Clinic followup with me on the 30th  ?  MD Eboni  ID Staff

## 2022-09-13 NOTE — HISTORICAL [facility] CERNER
This is a historical note converted from [place]. Formatting and pictures may have been removed.  Please reference [place] for original formatting and attached multimedia. Admit and Discharge Dates  Admit Date: 01/02/2020  Discharge Date: 01/04/2020  Physicians  Attending Physician - Trent JACKSON, Marshall RIVERS  Admitting Physician - Trent JACKSON, Marshall RIVERS  Consulting Physician - Farhat JACKSON, Neal Ham  Primary Care Physician - No PCP, No  Discharge Diagnosis  1.?Perirectal abscess?K61.1  2.?HIV disease?B20  3.?History of toxoplasmosis?Z86.19  Abdominal pain?[id number]  Rectal pain?[id number]  Surgical Procedures  01/03/2020 - [facility]-2020-7 - Incision & Drainage Rectal Abscess  Immunizations  No immunizations recorded for this visit.  Admission Information  Admitted to general surgery service on 1/2/2020  Hospital Course  Mr. Hill is a 44yoM PMH HIV/AIDS who presented to the ED on 1/2/20 with 12days of pain in his rectum. In the ED, he was afebrile, tachy to 104 at presentation which resolved, with leukocytosis of 13.4. CT imaging revealed distal perirectal abscess.? He was admitted to the general surgery service, made NPO, and on 1/3 he underwent incision and drainage of perirectal abscess in the OR. The surgery was without complication, and a Malecot drain was left in place to promote drainage. ID was consulted while inpatient for HIV/AIDs and non consistent treatment, and CD4 count 115. ID recommended Bactrim and Flagyl for abscess treatment and plan to follow up with him in clinic. On POD 1, the Malecot was removed and the patient was stable for discharge home on Bactrim, Flagyl, and Biktarvy. During his admission, he remained afebrile, HDS, and WBC improved. His pain was well controlled with PO medications, and he was urinating, passing flatus, and ambulating independently. He is scheduled to follow up in Surgery Clinic in 1 week and ID clinic at end of  January.  Significant Findings  (01/02/2020 17:52 CST CT Abdomen and Pelvis W Contrast)  There is distal perirectal fluid collection measuring 53 x 46 mm  (01/02/2020 17:52 CST CT Abdomen and Pelvis W Contrast)  Impression.  Distal perirectal fluid collection suspicious for abscess. [1]  Time Spent on discharge  25 minutes  Objective  Vitals & Measurements  T:?36.8? ?C (Oral)? TMIN:?36.4? ?C (Oral)? TMAX:?36.9? ?C (Temporal Artery)? HR:?71(Peripheral)? HR:?81(Monitored)? RR:?20? BP:?132/82? SpO2:?99%?  Physical Exam  NAD, AOx3  MMM  RRR, no murmurs  CTA bilaterally, no wheezes or crackles, no increased work of breathing on room air  Abd soft, NT, ND  Rectal:?mild tenderness to palpation around rectum. s/p removal of Malecot drain  no c/c/e, no calf tenderness bilaterally  Patient Discharge Condition  Stable  Discharge Disposition  Home   Discharge Medication Reconciliation  Prescribed  bictegravir/emtricitabine/tenofovir (Biktarvy oral tablet)?1 tab(s), Oral, Daily  gabapentin (gabapentin 300 mg oral capsule)?300 mg, Oral, TID  methocarbamol (Robaxin 750 mg oral tablet)?1,500 mg, Oral, TID  metroNIDAZOLE (Flagyl 500 mg oral tablet)?500 mg, Oral, q8hr  oxyCODONE (oxycodone 5 mg oral tablet)?5 mg, Oral, q4hr, PRN pain  sulfamethoxazole-trimethoprim (Bactrim  mg-160 mg oral tablet)?2 tab, Oral, q12hr  sulfamethoxazole-trimethoprim (Bactrim  mg-160 mg oral tablet)?2 tab, Oral, BID  sulfamethoxazole-trimethoprim (Bactrim  mg-160 mg oral tablet)?2 tab, Oral, q12hr  sulfamethoxazole-trimethoprim (Bactrim  mg-160 mg oral tablet)?2 tab, Oral, BID  traMADol (Ultram 50 mg oral tablet)?50 mg, Oral, q4hr, PRN pain, mild  Continue  acetaminophen-HYDROcodone (acetaminophen-hydrocodone 325 mg-7.5 mg oral tablet)?1 tab(s), Oral, TID  alPRAzolam (alPRAzolam 1 mg oral tablet)  azithromycin (azithromycin 600 mg oral tablet)?1,200 mg, Oral, qWeek  azithromycin (azithromycin 600 mg oral tablet)?1,200 mg, Oral,  qWeek  azithromycin (azithromycin 600 mg oral tablet)?1,200 mg, Oral, qWeek  azithromycin (azithromycin 600 mg oral tablet)?1,200 mg, Oral, qWeek  benzocaine topical (benzocaine 5% topical cream)?1 darryl, TOP, QID, PRN as needed for mouth sore pain  cobicistat-darunavir (Prezcobix 800 mg-150 mg oral tablet)?1 tab(s), Oral, Daily  hydrOXYzine (hydrOXYzine hydrochloride 25 mg oral tablet)?25 mg, Oral, QID, PRN as needed for itching  leucovorin (leucovorin 10 mg oral tablet)?10 mg, Oral, Daily  megestrol (Megace 40 mg/mL oral suspension)?800 mg, Oral, Daily  pyrimethamine (pyrimethamine 25 mg oral tablet)?75 mg, Oral, Daily  sulfADIAZINE (sulfADIAZINE 500 mg oral tablet)?750 mg, Oral, q6hr  zolpidem (zolpidem 10 mg oral tablet)?10 mg, Oral, Once a day (at bedtime)  Discontinue  emtricitabine-tenofovir (Descovy 200 mg-25 mg oral tablet)?1 tab(s), Oral, Daily  ergocalciferol (Vitamin D 50,000 intl units oral capsule)?50,000 IntUnit, Oral, 2x/Wk  Education and Orders Provided  Discharge - 01/04/20 12:10:00 CST, Home, Give all scheduled vaccinations prior to discharge.?  Discharge Activity - Activity as Tolerated, May shower as usual with soap and waterExpect drainage from rectum, use pads or gauze in underwear to collect?  Discharge Diet - Regular?  Follow up  [facility], within 1 week     [1]?CT Abdomen and Pelvis W Contrast; Boris JACKSON, Dejon Mantilla 01/02/2020 17:52 CST   I saw and evaluated the patient.  ???  [x ] I agree with the findings and the plan of care as documented in the residents note.  ?  ?

## 2022-09-13 NOTE — HISTORICAL [facility] CERNER
This is a historical note converted from [facility]. Formatting and pictures may have been removed.  Please reference [facility] for original formatting and attached multimedia. Chief Complaint  Perirectal Pain  History of Present Illness  Mr. Sergio Mckeon?is a 44-year-old gentleman with?PMH of?HIV,?CNS Toxoplasmosis,?prior episode of?Perirectal Abscess?(s/p I&D and ABx therapy with Bactrim, Flagyl), Anxiety, Depression; he presented to the [facility] ER on 05/05?with complaints of?perirectal pain?for the last 3 to 4 weeks.?He was having?difficulty hearing and was?able to respond to questions after repetition and lip reading.?Mr. Mckeon described his symptoms as?progressively worsening pain and tenderness?in his perirectal region?with associated?drainage of pus and blood. ?He?stated that his pain was?constant?and pronounced with?passing stool,?which had pus and blood?in it.??He reported?that after undergoing?surgical?drainage of?a perirectal abscess that he was found to have in 01/2020,?he felt?a nodular swelling?at the site?which then progressed?to enlargement and?rupture?a few weeks ago?with drainage of?purulent?and bloody fluid.?He denied having any?fever, abdominal pain, shortness of breath, cough, sputum production,?nausea/vomiting, diarrhea/constipation,?headache, numbness, weakness, dizziness/lightheadedness or loss of consciousness. ?Mr. Mckeon?prefers to be compliant with?his home prescription medications,?and stated that he had been taking his Biktarvy. ?He had?not followed up in?ID clinic?at the end of January as scheduled.  ?  PMH:  HIV (HIV VL 48576; CD4 count 115),?CNS Toxoplasmosis,?prior episode of?Perirectal Abscess? - 01/2020 (s/p I&D and ABx therapy with Bactrim, Flagyl), Anxiety, Depression  ?   FH:  Noncontributory  ?   SH:  Denies history of?tobacco smoking, alcohol or illicit drug use  ?   In the ER, Mr. Mckeon had a BP of 144/97 mmHg with a heart rate of 107/min.? He was saturating adequately at room air and was  afebrile. He was noted to have?a red, 5 x 7 cm?tender growth in his immediate left perirectal region/anal verge?which was leaking foul-smelling fluid. His labs showed serum sodium of 132, CO2 of 18, calcium of 10.3, BUN/creatinine of 43/2.30, AST/ALT of 26/12, ALT of 48, lactic acid of 1.4.? His CBC showed Hb/HCT of 10.1/31.1, MCV of 92.6, WBCs of 9.9 and platelets of 310. ?CT abdomen?and pelvis was ordered which reported?of fungating mass surrounding the anus measuring 7?x 8.2 cm?with likely slight extension?into the perirectal space on the left?concerning for neoplasm, several subtle hypo-attenuating lesions throughout the liver?as well as 1.7 cm indeterminate right adrenal nodule?with small bilateral renal cysts. Internal Medicine was paged for further evaluation and management. Blood Cultures, HIV VL, CD4 count were ordered. Mr Mckeon received Zosyn 3.375 mg and 2 L IV NS in the ER. He was started on IV Vancomycin, Cefepime, Flagyl and?IV LR at 125/hr. Wound Care was also consulted. Plan to consult Surgery and ID in the morning.  Review of Systems  Constitutional: no fever, chills, weight loss, night sweats, fatigue or weakness present  ENMT: no ear pain or sore throat, no nasal congestion, no nasal discharge, no sinus pain  Respiratory: no cough, no shortness of breath or wheezing, no sputum production  Cardiovascular: no chest pain, no palpitations or syncope, no orthopnea, no nocturnal dyspnea  Gastrointestinal: no abdominal pain, no epigastric burning,?perirectal lesion with?pain and tenderness?present, purulent and bloody?discharge?present;?no nausea/vomiting, no diarrhea or constipation  Genitourinary: no dysuria, no urinary frequency, no?urinary urgency or hematuria  Musculoskeletal: no myalgias or back pain, no joint pain, no joint swelling  Physical Exam  Vitals & Measurements  T:?36.7? ?C (Oral)? HR:?94(Monitored)? RR:?21? BP:?110/76? SpO2:?94%? WT:?67.25?kg?  General: alert?male lying comfortably in  bed,?very hard of hearing; in moderate distress due to?perirectal lesion  HENT: oral and oropharyngeal mucosa moist, pink, with no erythema or exudates, poor dentition, no ear pain or discharge  Neck: normal neck movement,?no lymph nodes or swellings, no JVD, no carotid bruit audible  Respiratory: clear breathing sounds bilaterally, no rales, rhonchi or wheezes  Cardiovascular: clear S1 and S2, no murmurs, rubs or gallops  Peripheral Vascular: no lesions, ulcers or erosions, normal peripheral pulses and no pedal edema  Gastrointestinal: soft, non-tender, non-distended abdomen, normal bowel sounds, no scars or masses seen; Rectal exam showed a 5 x 7 cm?flat, tender, flesh-colored?growth in his immediate left perirectal region/anal verge?which was leaking foul-smelling fluid; digital exam not done secondary to tenderness  Integumentary: normal skin color; multiple circular hyperpigmented macules seen diffusely spread on back; no other lesions  Assessment/Plan  Rectal Lesion secondary to [place]-Rectal Abscess vs Neoplastic Growth  Patient came in with complaints of perirectal?lesion with pain/tenderness?and?purulent-bloody drainage x 3-4 weeks  Patient has a?prior history of perirectal abscess in 01/2020 s/p I&D and ABx therapy with Bactrim, Flagyl  Examination showed?large, flat?perirectal growth?with drainage of foul-smelling fluid  Patient was vitally stable on admission  Patients labs were unremarkable with Lactic Acid of 1.4,?WBCs of 9.9  CT?abdomen?and pelvis showed a 7 x 8.2 cm fungating mass surrounding the anus with likely slight extension?into the perirectal space, hypo-attenuating lesions in liver; 1.7 cm?R adrenal nodule  Patient received 1 dose of Zosyn 3.375 gm in ER  Started on IV Vancomycin, Cefepime 2 g q12hrs, Flagyl 500 mg q8hrs  Patient currently on IV /hr; received 2 L IV NS in ER  Wound Care consulted for perirectal growth/lesion  Consult Surgery in the morning for evaluation for surgical  biopsy/excision of mass  Continue monitoring patients symptoms  ?  KENDRICK  Patient came in with BUN/Cr of 43/2.30  Received 2 L of IV NS in ER  Currently maintained on IV /hr  ?  HIV (Diagnosed 2010; h/o loss of follow up and non-compliance)  Patients last HIV VL was?97476; CD4 count was 115 (01/2020)  Patient professed to be taking Biktarvy although [facility] also showed Prezcobix as active medication; both continued for now  Patient did not follow up in ID Clinic in 01/2020 post-discharge as scheduled  Repeated CD4 count and HIV VL; F/U  Consult ID in the morning  ?  Multiple Episodes of CNS Toxoplasmosis  Patient has had multiple episodes in the past, even leading to transient paralysis at one point  Patient has hearing deficits B/L; unclear whether d/t residual damage from Toxoplasmosis  Patient was scheduled for f/u in ID Clinic post-discharge in 01/2020 after having undergone I&D and ABx treatment for perirectal abscess; follow up missed  Patient had Toxoplasma IgG of >400 in 2016; Toxo IgG, IgM and PCR were to be ordered but no results seen in chart  Consult ID in morning  ?  Normocytic Anemia  Likely due to bleeding from perirectal lesion  Patient had Hb/Hct of 10.1/31.1 with MCV of 92.6  Continue monitoring patients symptoms  Consult Surgery in the morning for evaluation for surgical biopsy/excision of mass  ?  Anxiety/Depression  Patient currently on Alprazolam and Zolpidem  Zolpidem continued in-patient  ?  PPx: Lovenox 40 mg  Disposition: Continue present ABx therapy and IVF; Consult Surgery and ID in the morning; F/U BCx, CD4 count, HIV VL; Continue monitoring patients symptoms   Problem List/Past Medical History  Ongoing  Anxiety state(  Confirmed  )  Depressive disorder(  Confirmed  )  HIV  Mononeuritis(  Confirmed  )  Toxoplasmosis(  Confirmed  )  Historical  No qualifying data  Procedure/Surgical History  Drainage of Rectum with Drainage Device, Percutaneous Approach  (01/03/2020)  Extraction of Buttock Subcutaneous Tissue and Fascia, Percutaneous Approach (01/03/2020)  Incision & Drainage Rectal Abscess (None) (01/03/2020)   Medications  Inpatient  acetaminophen, 500 mg= 1 tab(s), Oral, q6hr, PRN  acetaminophen, 500 mg= 1 tab(s), Oral, q6hr, PRN  Biktarvy oral tablet, 1 tab, Oral, Daily  cefepime (for IVPB)  Flagyl 500 mg / 100 ml premix, 500 mg= 100 mL, IV Piggyback, q2bi-Lrhfj  IVF Lactated Ringers LR Infusion 1,000 mL, 1000 mL, IV  Lovenox, 40 mg= 0.4 mL, Subcutaneous, Daily  Normal Saline (0.9% NS) IV 1,000 mL, 1000 mL, IV  Normal Saline (0.9% NS) IV 1,000 mL, 1000 mL, IV  Prezcobix 800 mg-150 mg oral tablet, 1 tab(s), Oral, Daily  Vancomycin (for IVPB), 1000 mg, IV Piggyback, q12hr  zolpidem 10 mg sublingual tablet, 10 mg= 1 tab(s), Oral, Once a day (at bedtime)  Zosyn, 3.375 gm= 50 mL, IV Piggyback, q8hr  Home  acetaminophen-hydrocodone 325 mg-7.5 mg oral tablet, 1 tab(s), Oral, TID  alPRAzolam 1 mg oral tablet  azithromycin 600 mg oral tablet, 1200 mg= 2 tab(s), Oral, qWeek, 1 refills,? ?Not taking  azithromycin 600 mg oral tablet, 1200 mg= 2 tab(s), Oral, qWeek, 1 refills,? ?Not taking  benzocaine 5% topical cream, 1 darryl, TOP, QID, PRN,? ?Not taking  Biktarvy oral tablet, 1 tab(s), Oral, Daily  Biktarvy oral tablet, 1 tab(s), Oral, Daily, 1 refills  ciprofloxacin 500 mg oral tablet, 500 mg= 1 tab(s), Oral, BID,? ?Not taking  fluconazole 200 mg oral tablet, 200 mg= 1 tab(s), Oral, Daily,? ?Not taking  gabapentin 300 mg oral capsule, 300 mg= 1 cap(s), Oral, TID  hydrOXYzine hydrochloride 25 mg oral tablet, 25 mg= 1 tab(s), Oral, QID, PRN,? ?Not taking  leucovorin 10 mg oral tablet, 10 mg= 1 tab(s), Oral, Daily, 1 refills,? ?Not taking  Megace 40 mg/mL oral suspension, 800 mg= 20 mL, Oral, Daily, 3 refills  methocarbamol 750 mg oral tablet, 1500 mg= 2 tab(s), Oral, TID,? ?Not taking  metronidazole 500 mg oral tablet, 500 mg= 1 tab(s), Oral, q8hr,? ?Not taking  Narcan 4  mg/0.1 mL nasal spray, 4 mg, Nasal, Once,? ?Not taking  oxycodone 5 mg oral tablet, 5 mg= 1 tab(s), Oral, q6hr,? ?Not taking  Prezcobix 800 mg-150 mg oral tablet, 1 tab(s), Oral, Daily, 3 refills,? ?Not taking  pyrimethamine 25 mg oral tablet, 75 mg= 3 tab(s), Oral, Daily, 1 refills,? ?Not taking  sulfADIAZINE 500 mg oral tablet, 750 mg= 1.5 tab(s), Oral, q6hr, 1 refills,? ?Not taking  sulfamethoxazole-trimethoprim 800 mg-160 mg oral tablet, 2 tab(s), Oral, BID,? ?Not taking  traMADol 50 mg oral tablet, 50 mg= 1 tab(s), Oral, q4hr,? ?Not taking  zolpidem 10 mg oral tablet, 10 mg= 1 tab(s), Oral, Once a day (at bedtime), 1 refills,? ?Not taking  Allergies  No Known Medication Allergies  Social History  Abuse/Neglect  No, 05/04/2020  Alcohol  Past, 06/10/2015  Employment/School  Disabled, 01/16/2020  Home/Environment  Lives with Children, Spouse. Living situation: Home/Independent., 01/16/2020  Nutrition/Health  Regular, Good, 01/16/2020  Spiritual/Cultural  Holiness, 01/16/2020  Substance Use - Denies Substance Abuse, 05/06/2015  Past, Marijuana, 06/10/2015  Tobacco - Denies Tobacco Use, 04/09/2015  Former smoker, quit more than 30 days ago, N/A, 05/04/2020  Family History  Cardiac arrest.: Brother.  Lab Results  Labs Last 24 Hours?  ?Chemistry? Hematology/Coagulation?   Sodium Lvl:?132 mmol/L?Low (05/04/20 21:30:34) PT: 14.2 second(s) (05/04/20 21:30:34)   Potassium Lvl: 4.6 mmol/L (05/04/20 21:30:34) INR: 1.11 (05/04/20 21:30:34)   Chloride: 103 mmol/L (05/04/20 21:30:34) PTT: 32.8 second(s) (05/04/20 21:30:34)   CO2:?18 mmol/L?Low (05/04/20 21:30:34) WBC: 9.9 x10(3)/mcL (05/04/20 21:30:34)   Calcium Lvl:?10.3 mg/dL?High (05/04/20 21:30:34) RBC:?3.36 x10(6)/mcL?Low (05/04/20 21:30:34)   Glucose Lvl: 90 mg/dL (05/04/20 21:30:34) Hgb:?10.1 gm/dL?Low (05/04/20 21:30:34)   BUN:?43 mg/dL?High (05/04/20 21:30:34) Hct:?31.1 %?Low (05/04/20 21:30:34)   Creatinine:?2.3 mg/dL?High (05/04/20 21:30:34) Platelet: 310 x10(3)/mcL  (05/04/20 21:30:34)   eGFR-AA:?40 mL/min?Low (05/04/20 21:30:34) MCV: 92.6 fL (05/04/20 21:30:34)   eGFR-SEBASTIAN:?33 mL/min?Low (05/04/20 21:30:34) MCH: 30.1 pg (05/04/20 21:30:34)   Bili Total: 0.3 mg/dL (05/04/20 21:30:34) MCHC: 32.5 gm/dL (05/04/20 21:30:34)   Bili Direct: 0.2 mg/dL (05/04/20 21:30:34) RDW: 12.4 % (05/04/20 21:30:34)   Bili Indirect: 0.1 mg/dL (05/04/20 21:30:34) MPV:?11.1 fL?High (05/04/20 21:30:34)   AST: 26 unit/L (05/04/20 21:30:34) Abs Neut: 6.9 x10(3)/mcL (05/04/20 21:30:34)   ALT: 12 unit/L (05/04/20 21:30:34) Neutro Auto: 70 % (05/04/20 21:30:34)   Alk Phos: 48 unit/L (05/04/20 21:30:34) Lymph Auto: 18 % (05/04/20 21:30:34)   Total Protein:?9.6 gm/dL?High (05/04/20 21:30:34) Mono Auto: 10 % (05/04/20 21:30:34)   Albumin Lvl:?3.1 gm/dL?Low (05/04/20 21:30:34) Eos Auto: 1 % (05/04/20 21:30:34)   Globulin:?6.5 gm/mL?High (05/04/20 21:30:34) Abs Eos: 0.1 x10(3)/mcL (05/04/20 21:30:34)   A/G Ratio:?0.5 ratio?Low (05/04/20 21:30:34) Basophil Auto: 0 % (05/04/20 21:30:34)   Lactic Acid Lvl: 1.4 mmol/L (05/04/20 21:30:34) Abs Neutro: 6.9 x10(3)/mcL (05/04/20 21:30:34)    Abs Lymph: 1.8 x10(3)/mcL (05/04/20 21:30:34)    Abs Mono: 1 x10(3)/mcL (05/04/20 21:30:34)    Abs Baso: 0 x10(3)/mcL (05/04/20 21:30:34)    IG%: 1 % (05/04/20 21:30:34)    IG#: 0.1 (05/04/20 21:30:34)       ?  Patient seen and examined with Dr. Holt. Agree with above assessment and plan.  ?   This is an unfortunate male with HIV (CD4 count 115 in Jan 2020) who presents with a rectal mass. Chief complaint?is the rectal pain that got worsen after he got discharged from the hospital in January. Patient presented in January 2020 with a rectal abscess. Now patient presents with rectal mass that is concerning for malignancy. Given open wound within the rectal mass, will empirically treat with broad spectrum ABX. Follow up with cultures. Picture of the lesion taken to the chart.Consult Surgery for evaluation and possible biopsy. Consult  ID for help with antibiotics and evaluation appropriate for his HIV. We ordered CD4 count and viral load. Patient also has an KENDRICK, suspecting likely 2/2 pre-renal etiology. Will continue with IVFs and expect improvement. Try to avoid nephrotoxic agents. Of note, patient is hard of hearing, so thorough HPI could not be obtained due to limited ability for proper communication. Questionable compliance to ART.  ?   Differential Dx:  New onset rectal mass concerning for possible malignancy  Acute kidney injury, likely pre-renal  HIV  History of CNS toxoplasmosis  Hyponatremia  Metabolic acidosis  Normocytic anemia   Patient seen and examined this morning. Patient very hard of hearing but does read lips and answers questions appropriately. Patient complaining of pain to rectal area, states area is still draining and has a little bit of blood in it. Patient denies blood in stool, constipation,?and diarrhea. Physical exam as noted above, rectal growth draining serosanguineous fluid with foul odor.  ?  A/P: agree with above; except will get CT liver protocol, DC Prezcobix at this time based on previous admission ID notes.  ?  Maci Becker,   [employer] [facility] Resident HO-1

## 2022-09-13 NOTE — HISTORICAL [facility] CERNER
This is a historical note converted from [place]. Formatting and pictures may have been removed.  Please reference [place] for original formatting and attached multimedia. Indication for Surgery  43 y/o M w/ PMHx of HIV (last CD4 count 115 January) w/ large fungating anal mass and concern for metastatic disease.  Preoperative Diagnosis  Fungating anal mass  Postoperative Diagnosis  Same  Operation  1. Examination under anesthesia  2. Biopsy of anal mass  Surgeon(s)  Resident: Raymond Shipley MD PGY3  ?  Staff: Angelika Sandra MD  Assistant  Jose Rafael Stockton MD PGY5  Anesthesia  General  Estimated Blood Loss  25cc  Findings  Fungating, necrotic?anal mass with erosion into the anal canal  Specimen(s)  Anal mass biopsy  Complications  none  Technique  After discussing the risks, benefits, alternatives, and appropriate informed consent obtained, the patient was taken to the operating room and placed on the table in the supine position. After the induction of general endotracheal anesthesia, the patient was placed in the lithotomy position and prepped and draped in the usual fashion. A time-out took place verifying the correct patient and procedure. Pt has a large fungating anal mass along the left and posterior anal margin. A well-lubricated Hill-Nieto retractor was placed into the anal canal, and the mass had eroded through the?posterior anus forming a fistulous connection. A sample of the anal mass was removed with Bovie electrocautery and sent for permanent specimen.?Hemostasis was achieved with electrocautery and the anal canal was packed with Gelfoam. Kerlix was?then placed over the anus and mesh panties were placed. The patient tolerated the procedure well without any immediate complications, was extubated and taken to the PACU in hemodynamically stable condition. All counts were correct x2 at the end of the?case. Dr. Sandra was present and available for all critical portions of the case.   I agree with resident  documentation. I was physically present, supervised resident, ?and discussed plan of care.

## 2022-09-13 NOTE — HISTORICAL [facility] CERNER
This is a historical note converted from [employer]. Formatting and pictures may have been removed.  Please reference [employer] for original formatting and attached multimedia. Chief Complaint  rectal pain  Reason for Consultation  perirectal/anal mass concerning for malignancy  History of Present Illness  44 year old male patient with past medical hx of HIV (last CD4 count 115 on Jan)?and prior CNS toxoplasmosis who is admitted by the Internal Medicine team one day ago for management of HIV, KENDRICK and qiana-rectal/anal mass. Patient has had rectal/anal mass since months ago and had a previous EUA on January 2020 during which perirectal/anal abscess was drained. At that time CT imaging showed mass to be 4.5cm approx. CT imaging at this time shows mass with a size of about 8cm. Patient states since January he has had rectal pain that has been progressing and drainage.  ?  Patient has limited hearing.?Much of the information was obtained from wife who is his primary contact. Mrs. Dalal states that she has been worried for him since after surgery because drainage has not subsided and has continued for over these months. He has been wearing a diaper for the drainage. Color is described as pink sanguinous with fecal matter. He has been having bowel movements but very difficult to tell if he has had fecal incontinence due to drainage. She also states he has had loss of appetite and weight loss of about 20lbs in the past months. He took bactrim and flagyl for about one month after procedure during January. Has been intermittently compliant with his HIV medications. Denies nausea, vomiting, fever. States hasnt he?had a colonoscopy before. No prior surgeries except for the incision and drainage performed on January.  Review of Systems  Constitutional:?no fever, positive for weakness, positive for weight loss  Eye:?no eye redness, drainage, or pain  ENMT:?no sore throat, ear pain, sinus pain/congestion, nasal  congestion/drainage  Respiratory:?no cough, no wheezing, no shortness of breath  Cardiovascular:?no chest pain, no palpitations, no edema  Gastrointestinal:?no nausea, vomiting, or diarrhea. no abdominal pain  Anorectal: positive for pain  ?  Physical Exam  Vitals & Measurements  T:?36.5? ?C (Oral)? TMIN:?36.4? ?C (Oral)? TMAX:?36.8? ?C (Oral)? HR:?106(Peripheral)? RR:?19? BP:?104/66? SpO2:?100%? WT:?67.25?kg? BMI:?24.79?  General:?cachectic, fragile appearing  Respiratory:?clear to auscultation bilaterally, no increased work of breathing  Cardiovascular:?regular rate and rhythm without murmurs, gallops or rubs  Gastrointestinal:?soft, non-tender, non-distended with normal bowel sounds, without masses to palpation, no visible?incisional?scars  Pelvic: lymphadenopathy was palpable bilateral pelvic nodes  Anal inspection: left qiana anal mass with serosanguinous drainage?mixed with fecal matter  Digital exam: limited due to pain, however part of irregular mass was able to be palpated  Genitourinary: no villatoro  ?  Assessment/Plan  Abscess - perineal or perirectal?[id number]  Rectal pain?[id number]  ?  44 year old male with HIV with qiana-anal/rectal mass highly suspicious for malignancy with possible superposing infection.  Imaging remarkable for increase in size of mass from January 2020 from 4cm to 8cm and new liver and right adrenal lesions.  ?  - OR tomorrow for EUA, biopsy, possible I&D  - npo at midnight  - IV antibiotics  - CT of chest with?IV contrast and Pelvic MRI with contrast to evaluate staging  - hydration before imaging due to  - may consider IR biopsy of new liver lesion  - CEA labs and follow up HIV labs  - COVID test follow up for OR  - will need colonoscopy at some point  - rest of care per primary team  ?  Patient seen and evaluated with Janette Benitez MD.  Wife Mulugeta updated on patient status and of procedure tomorrow.  ?  Katharine Hartman  MD [place]  [employer] General Surgery PGY-1   Problem List/Past Medical History  Ongoing  Anxiety state(  Confirmed  )  Depressive disorder(  Confirmed  )  HIV  Mononeuritis(  Confirmed  )  Toxoplasmosis(  Confirmed  )  Historical  No qualifying data  Procedure/Surgical History  Drainage of Rectum with Drainage Device, Percutaneous Approach (01/03/2020)  Extraction of Buttock Subcutaneous Tissue and Fascia, Percutaneous Approach (01/03/2020)  Incision & Drainage Rectal Abscess (None) (01/03/2020)   Medications  Inpatient  acetaminophen, 500 mg= 1 tab(s), Oral, q6hr, PRN  acetaminophen, 500 mg= 1 tab(s), Oral, q6hr, PRN  Biktarvy oral tablet, 1 tab, Oral, Daily  cefepime (for IVPB)  Flagyl 500 mg / 100 ml premix, 500 mg= 100 mL, IV Piggyback, [id number]  IVF Lactated Ringers LR Infusion 1,000 mL, 1000 mL, IV  Lovenox, 40 mg= 0.4 mL, Subcutaneous, Daily  Microcyn Topical Spray, 1 darryl, TOP, TID, PRN  Normal Saline (0.9% NS) IV 1,000 mL, 1000 mL, IV  Normal Saline (0.9% NS) IV 1,000 mL, 1000 mL, IV  vancomycin, 750 mg= 150 mL, IV Piggyback, q18hr  zolpidem 10 mg sublingual tablet, 10 mg= 1 tab(s), Oral, Once a day (at bedtime)  Home  acetaminophen-hydrocodone 325 mg-7.5 mg oral tablet, 1 tab(s), Oral, TID  alPRAzolam 1 mg oral tablet  azithromycin 600 mg oral tablet, 1200 mg= 2 tab(s), Oral, qWeek, 1 refills,? ?Not taking  azithromycin 600 mg oral tablet, 1200 mg= 2 tab(s), Oral, qWeek, 1 refills,? ?Not taking  benzocaine 5% topical cream, 1 darryl, TOP, QID, PRN,? ?Not taking  Biktarvy oral tablet, 1 tab(s), Oral, Daily  Biktarvy oral tablet, 1 tab(s), Oral, Daily, 1 refills  ciprofloxacin 500 mg oral tablet, 500 mg= 1 tab(s), Oral, BID,? ?Not taking  fluconazole 200 mg oral tablet, 200 mg= 1 tab(s), Oral, Daily,? ?Not taking  gabapentin 300 mg oral capsule, 300 mg= 1 cap(s), Oral, TID  hydrOXYzine hydrochloride 25 mg oral tablet, 25 mg= 1 tab(s), Oral, QID, PRN,? ?Not taking  leucovorin 10 mg oral tablet, 10 mg= 1  tab(s), Oral, Daily, 1 refills,? ?Not taking  Megace 40 mg/mL oral suspension, 800 mg= 20 mL, Oral, Daily, 3 refills  methocarbamol 750 mg oral tablet, 1500 mg= 2 tab(s), Oral, TID,? ?Not taking  metronidazole 500 mg oral tablet, 500 mg= 1 tab(s), Oral, q8hr,? ?Not taking  Narcan 4 mg/0.1 mL nasal spray, 4 mg, Nasal, Once,? ?Not taking  oxycodone 5 mg oral tablet, 5 mg= 1 tab(s), Oral, q6hr,? ?Not taking  Prezcobix 800 mg-150 mg oral tablet, 1 tab(s), Oral, Daily, 3 refills,? ?Not taking  pyrimethamine 25 mg oral tablet, 75 mg= 3 tab(s), Oral, Daily, 1 refills,? ?Not taking  sulfADIAZINE 500 mg oral tablet, 750 mg= 1.5 tab(s), Oral, q6hr, 1 refills,? ?Not taking  sulfamethoxazole-trimethoprim 800 mg-160 mg oral tablet, 2 tab(s), Oral, BID,? ?Not taking  traMADol 50 mg oral tablet, 50 mg= 1 tab(s), Oral, q4hr,? ?Not taking  zolpidem 10 mg oral tablet, 10 mg= 1 tab(s), Oral, Once a day (at bedtime), 1 refills,? ?Not taking  Allergies  No Known Medication Allergies  Social History  Abuse/Neglect  No, No, Yes, 05/05/2020  Alcohol  Past, 06/10/2015  Employment/School  Disabled, 01/16/2020  Home/Environment  Lives with Children, Spouse. Living situation: Home/Independent., 01/16/2020  Nutrition/Health  Regular, Good, 01/16/2020  Spiritual/Cultural  Yazidi, 01/16/2020  Substance Use - Denies Substance Abuse, 05/06/2015  Past, Marijuana, 06/10/2015  Tobacco - Denies Tobacco Use, 04/09/2015  Former smoker, quit more than 30 days ago, N/A, Household tobacco concerns: No., 05/05/2020  Family History  Cardiac arrest.: Brother.  Lab Results  WBC 9.6  H/H 8.5/26.2  ?  Creatinine 1.7  Diagnostic Results  (05/04/2020 23:25 CDT CT Abdomen and Pelvis W Contrast)  Impression.  1. Large indeterminate enhancing distal perirectal/anal masslike  lesion.  2. Liver lesions and right adrenal nodules are indeterminate and new  compared to January 2020.  3. Other findings above. [1]     [1]?CT Abdomen and Pelvis W Contrast; Dejon Escalante MD  Jose Rafael 05/04/2020 23:25 CDT